# Patient Record
Sex: MALE | Race: WHITE | NOT HISPANIC OR LATINO | Employment: OTHER | URBAN - NONMETROPOLITAN AREA
[De-identification: names, ages, dates, MRNs, and addresses within clinical notes are randomized per-mention and may not be internally consistent; named-entity substitution may affect disease eponyms.]

---

## 2021-04-01 ENCOUNTER — IMMUNIZATION (OUTPATIENT)
Dept: VACCINE CLINIC | Facility: HOSPITAL | Age: 61
End: 2021-04-01

## 2021-04-01 PROCEDURE — 0001A: CPT | Performed by: INTERNAL MEDICINE

## 2021-04-01 PROCEDURE — 91300 HC SARSCOV02 VAC 30MCG/0.3ML IM: CPT | Performed by: INTERNAL MEDICINE

## 2021-04-29 ENCOUNTER — IMMUNIZATION (OUTPATIENT)
Dept: VACCINE CLINIC | Facility: HOSPITAL | Age: 61
End: 2021-04-29

## 2021-04-29 PROCEDURE — 91300 HC SARSCOV02 VAC 30MCG/0.3ML IM: CPT | Performed by: INTERNAL MEDICINE

## 2021-04-29 PROCEDURE — 0002A: CPT | Performed by: INTERNAL MEDICINE

## 2022-08-26 ENCOUNTER — LAB (OUTPATIENT)
Dept: LAB | Facility: HOSPITAL | Age: 62
End: 2022-08-26

## 2022-08-26 ENCOUNTER — OFFICE VISIT (OUTPATIENT)
Dept: INTERNAL MEDICINE | Facility: CLINIC | Age: 62
End: 2022-08-26

## 2022-08-26 VITALS
DIASTOLIC BLOOD PRESSURE: 60 MMHG | BODY MASS INDEX: 24.8 KG/M2 | HEIGHT: 63 IN | OXYGEN SATURATION: 97 % | TEMPERATURE: 97 F | HEART RATE: 44 BPM | RESPIRATION RATE: 20 BRPM | WEIGHT: 140 LBS | SYSTOLIC BLOOD PRESSURE: 132 MMHG

## 2022-08-26 DIAGNOSIS — I25.10 CORONARY ARTERY DISEASE INVOLVING NATIVE CORONARY ARTERY OF NATIVE HEART WITHOUT ANGINA PECTORIS: ICD-10-CM

## 2022-08-26 DIAGNOSIS — I73.9 PAD (PERIPHERAL ARTERY DISEASE): ICD-10-CM

## 2022-08-26 DIAGNOSIS — G89.29 CHRONIC LOW BACK PAIN, UNSPECIFIED BACK PAIN LATERALITY, UNSPECIFIED WHETHER SCIATICA PRESENT: ICD-10-CM

## 2022-08-26 DIAGNOSIS — I25.10 CORONARY ARTERY DISEASE INVOLVING NATIVE CORONARY ARTERY OF NATIVE HEART WITHOUT ANGINA PECTORIS: Primary | ICD-10-CM

## 2022-08-26 DIAGNOSIS — R41.3 MEMORY DEFICIT: ICD-10-CM

## 2022-08-26 DIAGNOSIS — M54.50 CHRONIC LOW BACK PAIN, UNSPECIFIED BACK PAIN LATERALITY, UNSPECIFIED WHETHER SCIATICA PRESENT: ICD-10-CM

## 2022-08-26 DIAGNOSIS — I25.2 HISTORY OF HEART ATTACK: ICD-10-CM

## 2022-08-26 DIAGNOSIS — I48.0 PAROXYSMAL ATRIAL FIBRILLATION: ICD-10-CM

## 2022-08-26 DIAGNOSIS — R73.9 HYPERGLYCEMIA: ICD-10-CM

## 2022-08-26 DIAGNOSIS — Z23 NEED FOR COVID-19 VACCINE: ICD-10-CM

## 2022-08-26 DIAGNOSIS — I73.9 INTERMITTENT CLAUDICATION: ICD-10-CM

## 2022-08-26 DIAGNOSIS — Z95.5 S/P DRUG ELUTING CORONARY STENT PLACEMENT: ICD-10-CM

## 2022-08-26 DIAGNOSIS — J44.1 COPD EXACERBATION: ICD-10-CM

## 2022-08-26 PROBLEM — R29.898 WEAKNESS OF LOWER EXTREMITY: Status: ACTIVE | Noted: 2021-05-10

## 2022-08-26 PROBLEM — Z79.891 LONG-TERM CURRENT USE OF OPIATE ANALGESIC: Status: ACTIVE | Noted: 2018-09-17

## 2022-08-26 PROBLEM — F17.200 TOBACCO DEPENDENCE: Status: ACTIVE | Noted: 2020-12-03

## 2022-08-26 PROBLEM — I70.229 CRITICAL LOWER LIMB ISCHEMIA (HCC): Status: ACTIVE | Noted: 2021-08-31

## 2022-08-26 PROBLEM — F41.0 PANIC ATTACKS: Status: ACTIVE | Noted: 2020-10-15

## 2022-08-26 PROBLEM — Z91.81 AT HIGH RISK FOR FALLS: Status: ACTIVE | Noted: 2022-01-19

## 2022-08-26 PROBLEM — T82.868A: Status: ACTIVE | Noted: 2021-08-31

## 2022-08-26 PROBLEM — Z79.4 ENCOUNTER FOR LONG-TERM (CURRENT) USE OF INSULIN: Status: ACTIVE | Noted: 2018-09-17

## 2022-08-26 PROBLEM — N17.9 ACUTE KIDNEY INJURY: Status: ACTIVE | Noted: 2021-09-02

## 2022-08-26 PROBLEM — M54.16 LUMBAR RADICULOPATHY: Status: ACTIVE | Noted: 2019-02-04

## 2022-08-26 PROBLEM — G63 POLYNEUROPATHY ASSOCIATED WITH UNDERLYING DISEASE: Status: ACTIVE | Noted: 2021-09-01

## 2022-08-26 PROBLEM — F10.11 HISTORY OF ALCOHOL ABUSE: Status: ACTIVE | Noted: 2020-05-21

## 2022-08-26 PROBLEM — J43.1 PANLOBULAR EMPHYSEMA: Status: ACTIVE | Noted: 2021-01-11

## 2022-08-26 PROBLEM — D62 ANEMIA DUE TO BLOOD LOSS, ACUTE: Status: ACTIVE | Noted: 2021-09-02

## 2022-08-26 PROBLEM — E78.2 MODERATE MIXED HYPERLIPIDEMIA NOT REQUIRING STATIN THERAPY: Status: ACTIVE | Noted: 2020-05-21

## 2022-08-26 PROBLEM — I21.3 ST ELEVATION (STEMI) MYOCARDIAL INFARCTION OF UNSPECIFIED SITE (HCC): Status: ACTIVE | Noted: 2020-05-08

## 2022-08-26 PROBLEM — I10 ESSENTIAL HYPERTENSION: Status: ACTIVE | Noted: 2019-02-04

## 2022-08-26 PROBLEM — R63.4 ABNORMAL WEIGHT LOSS: Status: ACTIVE | Noted: 2019-02-04

## 2022-08-26 LAB
ALBUMIN SERPL-MCNC: 4.4 G/DL (ref 3.5–5.2)
ALBUMIN/GLOB SERPL: 1.6 G/DL
ALP SERPL-CCNC: 106 U/L (ref 39–117)
ALT SERPL W P-5'-P-CCNC: 14 U/L (ref 1–41)
ANION GAP SERPL CALCULATED.3IONS-SCNC: 14.6 MMOL/L (ref 5–15)
AST SERPL-CCNC: 17 U/L (ref 1–40)
BASOPHILS # BLD AUTO: 0.07 10*3/MM3 (ref 0–0.2)
BASOPHILS NFR BLD AUTO: 0.8 % (ref 0–1.5)
BILIRUB SERPL-MCNC: 0.3 MG/DL (ref 0–1.2)
BUN SERPL-MCNC: 20 MG/DL (ref 8–23)
BUN/CREAT SERPL: 15.4 (ref 7–25)
CALCIUM SPEC-SCNC: 9.6 MG/DL (ref 8.6–10.5)
CHLORIDE SERPL-SCNC: 102 MMOL/L (ref 98–107)
CHOLEST SERPL-MCNC: 132 MG/DL (ref 0–200)
CO2 SERPL-SCNC: 20.4 MMOL/L (ref 22–29)
CREAT SERPL-MCNC: 1.3 MG/DL (ref 0.76–1.27)
DEPRECATED RDW RBC AUTO: 45.3 FL (ref 37–54)
EGFRCR SERPLBLD CKD-EPI 2021: 62.9 ML/MIN/1.73
EOSINOPHIL # BLD AUTO: 0.14 10*3/MM3 (ref 0–0.4)
EOSINOPHIL NFR BLD AUTO: 1.6 % (ref 0.3–6.2)
ERYTHROCYTE [DISTWIDTH] IN BLOOD BY AUTOMATED COUNT: 13.1 % (ref 12.3–15.4)
GLOBULIN UR ELPH-MCNC: 2.8 GM/DL
GLUCOSE SERPL-MCNC: 91 MG/DL (ref 65–99)
HCT VFR BLD AUTO: 41.8 % (ref 37.5–51)
HDLC SERPL-MCNC: 38 MG/DL (ref 40–60)
HGB BLD-MCNC: 13.9 G/DL (ref 13–17.7)
IMM GRANULOCYTES # BLD AUTO: 0.05 10*3/MM3 (ref 0–0.05)
IMM GRANULOCYTES NFR BLD AUTO: 0.6 % (ref 0–0.5)
LDLC SERPL CALC-MCNC: 81 MG/DL (ref 0–100)
LDLC/HDLC SERPL: 2.16 {RATIO}
LYMPHOCYTES # BLD AUTO: 2.91 10*3/MM3 (ref 0.7–3.1)
LYMPHOCYTES NFR BLD AUTO: 34.1 % (ref 19.6–45.3)
MCH RBC QN AUTO: 31.5 PG (ref 26.6–33)
MCHC RBC AUTO-ENTMCNC: 33.3 G/DL (ref 31.5–35.7)
MCV RBC AUTO: 94.8 FL (ref 79–97)
MONOCYTES # BLD AUTO: 0.59 10*3/MM3 (ref 0.1–0.9)
MONOCYTES NFR BLD AUTO: 6.9 % (ref 5–12)
NEUTROPHILS NFR BLD AUTO: 4.77 10*3/MM3 (ref 1.7–7)
NEUTROPHILS NFR BLD AUTO: 56 % (ref 42.7–76)
NRBC BLD AUTO-RTO: 0 /100 WBC (ref 0–0.2)
PLATELET # BLD AUTO: 348 10*3/MM3 (ref 140–450)
PMV BLD AUTO: 10.2 FL (ref 6–12)
POTASSIUM SERPL-SCNC: 4.2 MMOL/L (ref 3.5–5.2)
PROT SERPL-MCNC: 7.2 G/DL (ref 6–8.5)
RBC # BLD AUTO: 4.41 10*6/MM3 (ref 4.14–5.8)
SODIUM SERPL-SCNC: 137 MMOL/L (ref 136–145)
T4 FREE SERPL-MCNC: 1.55 NG/DL (ref 0.93–1.7)
TRIGL SERPL-MCNC: 60 MG/DL (ref 0–150)
TSH SERPL DL<=0.05 MIU/L-ACNC: 3.07 UIU/ML (ref 0.27–4.2)
VLDLC SERPL-MCNC: 13 MG/DL (ref 5–40)
WBC NRBC COR # BLD: 8.53 10*3/MM3 (ref 3.4–10.8)

## 2022-08-26 PROCEDURE — 91305 COVID-19 (PFIZER) 12+ YRS: CPT | Performed by: NURSE PRACTITIONER

## 2022-08-26 PROCEDURE — 84443 ASSAY THYROID STIM HORMONE: CPT | Performed by: NURSE PRACTITIONER

## 2022-08-26 PROCEDURE — 80053 COMPREHEN METABOLIC PANEL: CPT | Performed by: NURSE PRACTITIONER

## 2022-08-26 PROCEDURE — 0054A COVID-19 (PFIZER) 12+ YRS: CPT | Performed by: NURSE PRACTITIONER

## 2022-08-26 PROCEDURE — 84439 ASSAY OF FREE THYROXINE: CPT | Performed by: NURSE PRACTITIONER

## 2022-08-26 PROCEDURE — 80061 LIPID PANEL: CPT | Performed by: NURSE PRACTITIONER

## 2022-08-26 PROCEDURE — 85025 COMPLETE CBC W/AUTO DIFF WBC: CPT | Performed by: NURSE PRACTITIONER

## 2022-08-26 PROCEDURE — 99204 OFFICE O/P NEW MOD 45 MIN: CPT | Performed by: NURSE PRACTITIONER

## 2022-08-26 RX ORDER — AMOXICILLIN 250 MG
1 CAPSULE ORAL DAILY
COMMUNITY
Start: 2022-07-29 | End: 2022-11-03 | Stop reason: SDUPTHER

## 2022-08-26 RX ORDER — ASPIRIN 81 MG/1
81 TABLET, CHEWABLE ORAL DAILY
COMMUNITY
Start: 2022-07-29 | End: 2022-11-03 | Stop reason: SDUPTHER

## 2022-08-26 RX ORDER — RIVAROXABAN 20 MG/1
20 TABLET, FILM COATED ORAL
COMMUNITY
Start: 2022-07-29 | End: 2022-12-02 | Stop reason: SDUPTHER

## 2022-08-26 RX ORDER — HYDROXYZINE HYDROCHLORIDE 25 MG/1
TABLET, FILM COATED ORAL
COMMUNITY
End: 2023-01-30 | Stop reason: SDUPTHER

## 2022-08-26 RX ORDER — GABAPENTIN 300 MG/1
300 CAPSULE ORAL 3 TIMES DAILY
COMMUNITY
Start: 2022-08-09

## 2022-08-26 RX ORDER — PANTOPRAZOLE SODIUM 40 MG/1
40 TABLET, DELAYED RELEASE ORAL DAILY
COMMUNITY
Start: 2022-01-19

## 2022-08-26 RX ORDER — ATORVASTATIN CALCIUM 80 MG/1
80 TABLET, FILM COATED ORAL DAILY
COMMUNITY
Start: 2022-08-03 | End: 2023-02-07 | Stop reason: SDUPTHER

## 2022-08-26 RX ORDER — OXYCODONE AND ACETAMINOPHEN 10; 325 MG/1; MG/1
1 TABLET ORAL EVERY 6 HOURS PRN
COMMUNITY
Start: 2022-08-10

## 2022-08-26 NOTE — PROGRESS NOTES
Subjective   Kike Escamilla is a 61 y.o. male    Chief Complaint   Patient presents with   • Establish Care   • Leg Pain     2 yrs, right leg, whole leg, very painful and numb from groin down     History of Present Illness       New pt here to establish care     Pt is unfortunately a poor historian, so med hx is difficult to obtain.      CAD - h/o MI, unsure of date.  States that he knows he was followed previously by [facility], but would like to transfer to [facility].  He denies any current CP or SOA.  States that his HR always runs low    PAD - pt states that he has known PAD.  Has had procedures to both legs.  Unsure of date or where. In review of outside records, he did have an JAK and arterial duplex in June 2021 showing:  Left:  Abnormal study consistent with hemodynamically significant arterial inflow disease resulting in a severe insufficiency at rest.   Left limb of endograft is occluded, with flow reconstituted to the common femoral artery via collateral vessels    Then in March 2022 he had repeat JAK's showing:  Right: Abnormal study consistent with hemodynamically significant infrainguinal arterial disease resulting in a mild insufficiency at rest. There is a significant decrease when compared to the previous study on 6/7/2021.     Left:  Abnormal study consistent with hemodynamically significant infrainguinal arterial disease resulting in a mild insufficiency at rest.  There is a significant improvement when compared to the previous study on 6/7/2021.      Arterial Duplex 3/22:  The aorto iliac stent grafts are patent; no evidence of a hemodynamically significant stenosis.      He states that his legs are very painful.  He can only walk 10-20 feet before his legs begin to hurt.  He would like to be referred to vascular.  States that his sx's are similar to what they were prior to him needing intervention for PAD.      HL - chronic and stable on Lipitor 80 mg daily.     Chronic Pain - managed by [facility]  pain    Pt states that he knows he has problems with his memory.  Not currently followed by Neuro, but does agree to a referral    Past Medical History:   Diagnosis Date   • CAD (coronary artery disease)    • COPD (chronic obstructive pulmonary disease) ([facility])    • History of heart attack    • Hyperlipidemia    • Low back pain    • PAD (peripheral artery disease) ([facility])      History reviewed. No pertinent surgical history.    Family History   Problem Relation Age of Onset   • COPD Mother    • No Known Problems Father    • No Known Problems Brother    • No Known Problems Son      Social History     Socioeconomic History   • Marital status:    Tobacco Use   • Smoking status: Current Every Day Smoker     Packs/day: 0.25     Years: 40.00     Pack years: 10.00     Types: Cigarettes     Start date: 1/1/1975   • Smokeless tobacco: Never Used   Vaping Use   • Vaping Use: Never used   Substance and Sexual Activity   • Alcohol use: Not Currently   • Drug use: Never     Allergies   Allergen Reactions   • Pregabalin GI Intolerance and Other (See Comments)         The following portions of the patient's history were reviewed and updated as appropriate: allergies, current medications, past family history, past medical history, past social history, past surgical history and problem list.    Current Outpatient Medications:   •  aspirin 81 MG chewable tablet, , Disp: , Rfl:   •  atorvastatin (LIPITOR) 80 MG tablet, , Disp: , Rfl:   •  cyanocobalamin (VITAMIN B-12) 1000 MCG tablet, cyanocobalamin (vit B-12) 1,000 mcg tablet  Take by oral route., Disp: , Rfl:   •  gabapentin (NEURONTIN) 300 MG capsule, , Disp: , Rfl:   •  hydrOXYzine (ATARAX) 25 MG tablet, hydroxyzine HCl 25 mg tablet  Take by oral route., Disp: , Rfl:   •  oxyCODONE-acetaminophen (PERCOCET)  MG per tablet, , Disp: , Rfl:   •  pantoprazole (PROTONIX) 40 MG EC tablet, pantoprazole 40 mg tablet,delayed release  PRN, Disp: , Rfl:   •  ProAir  (90 Base)  MCG/ACT inhaler, , Disp: , Rfl:   •  sennosides-docusate (PERICOLACE) 8.6-50 MG per tablet, , Disp: , Rfl:   •  umeclidinium-vilanterol (Anoro Ellipta) 62.5-25 MCG/INH aerosol powder  inhaler, Anoro Ellipta 62.5 mcg-25 mcg/actuation powder for inhalation  Inhale by inhalation route., Disp: , Rfl:   •  Xarelto 20 MG tablet, , Disp: , Rfl:      Review of Systems   Constitutional: Negative for chills, fatigue and fever.   Respiratory: Negative for cough, chest tightness and shortness of breath.    Cardiovascular: Negative for chest pain and leg swelling.        Leg pain   Gastrointestinal: Negative for abdominal pain, diarrhea, nausea and vomiting.   Endocrine: Negative for cold intolerance and heat intolerance.   Musculoskeletal: Positive for arthralgias and myalgias.   Neurological: Negative for dizziness.   Psychiatric/Behavioral: Positive for confusion.       Objective   Physical Exam  Constitutional:       Appearance: He is well-developed.   HENT:      Head: Normocephalic and atraumatic.   Eyes:      Conjunctiva/sclera: Conjunctivae normal.      Pupils: Pupils are equal, round, and reactive to light.   Cardiovascular:      Rate and Rhythm: Normal rate and regular rhythm.      Pulses:           Dorsalis pedis pulses are 1+ on the right side and 1+ on the left side.        Posterior tibial pulses are 1+ on the right side and 1+ on the left side.      Heart sounds: Normal heart sounds.   Pulmonary:      Effort: Pulmonary effort is normal.      Breath sounds: Normal breath sounds.   Abdominal:      General: Bowel sounds are normal.      Palpations: Abdomen is soft.   Musculoskeletal:         General: Normal range of motion.      Cervical back: Normal range of motion.      Right lower leg: No edema.      Left lower leg: No edema.   Skin:     General: Skin is warm and dry.   Neurological:      Mental Status: He is alert and oriented to person, place, and time.      Deep Tendon Reflexes: Reflexes are normal and  "symmetric.   Psychiatric:         Speech: Speech normal.         Behavior: Behavior normal.         Thought Content: Thought content normal.         Cognition and Memory: Cognition is impaired. Memory is impaired.         Judgment: Judgment normal.       Vitals:    08/26/22 1048   BP: 132/60   Cuff Size: Adult   Pulse: (!) 44   Resp: 20   Temp: 97 °F (36.1 °C)   TempSrc: Infrared   SpO2: 97%   Weight: 63.5 kg (140 lb)   Height: 160 cm (63\")         Assessment & Plan   Diagnoses and all orders for this visit:    1. Coronary artery disease involving native coronary artery of native heart without angina pectoris (Primary)  -     Ambulatory Referral to Cardiology  -     [facility] & Differential; Future  -     Comprehensive Metabolic Panel; Future  -     Lipid Panel; Future  -     TSH; Future  -     T4, Free; Future    2. Paroxysmal atrial fibrillation (HCC)  -     Ambulatory Referral to Cardiology  -     CBC & Differential; Future  -     Comprehensive Metabolic Panel; Future  -     Lipid Panel; Future  -     TSH; Future  -     T4, Free; Future    3. S/P drug eluting coronary stent placement  -     Ambulatory Referral to Cardiology  -     CBC & Differential; Future  -     Comprehensive Metabolic Panel; Future  -     Lipid Panel; Future  -     TSH; Future  -     T4, Free; Future    4. History of heart attack  -     Ambulatory Referral to Cardiology  -     CBC & Differential; Future  -     Comprehensive Metabolic Panel; Future  -     Lipid Panel; Future  -     TSH; Future  -     T4, Free; Future    5. PAD (peripheral artery disease) (HCC)  -     Ambulatory Referral to Cardiology  -     Ambulatory Referral to Vascular Surgery  -     [facility] & Differential; Future  -     Comprehensive Metabolic Panel; Future  -     Lipid Panel; Future  -     TSH; Future  -     T4, Free; Future    6. Hyperglycemia  -     CBC & Differential; Future  -     Comprehensive Metabolic Panel; Future  -     Lipid Panel; Future  -     TSH; Future  -     T4, " Free; Future    7. Chronic low back pain, unspecified back pain laterality, unspecified whether sciatica present  -     CBC & Differential; Future  -     Comprehensive Metabolic Panel; Future  -     Lipid Panel; Future  -     TSH; Future  -     T4, Free; Future    8. COPD exacerbation (HCC)  -     Ambulatory Referral to Pulmonology  -     CBC & Differential; Future  -     Comprehensive Metabolic Panel; Future  -     Lipid Panel; Future  -     TSH; Future  -     T4, Free; Future    9. Intermittent claudication (HCC)  -     Ambulatory Referral to Vascular Surgery  -     CBC & Differential; Future  -     Comprehensive Metabolic Panel; Future  -     Lipid Panel; Future  -     TSH; Future  -     T4, Free; Future    10. Memory deficit  -     Ambulatory Referral to Neurology  -     CBC & Differential; Future  -     Comprehensive Metabolic Panel; Future  -     Lipid Panel; Future  -     TSH; Future  -     T4, Free; Future    11. Need for COVID-19 vaccine  -     COVID-19 Vaccine (Pfizer) Gray Cap      Referred to Cardiology, Vascular, Neurology and Pulm  Labs sent today  No refills needed  COVID booster updated  Return in about 6 weeks (around 10/7/2022) for Annual.

## 2022-08-31 ENCOUNTER — TELEPHONE (OUTPATIENT)
Dept: INTERNAL MEDICINE | Facility: CLINIC | Age: 62
End: 2022-08-31

## 2022-08-31 NOTE — TELEPHONE ENCOUNTER
Caller: Kike Escamilla    Relationship: Self    Best call back number: [phone]    What specialty or service is being requested: CARDIOLOGY     What is the provider, practice or medical service name: [facility]       Any additional details:      APPOINTMENT WITH VASCULAR/HEART DOCTOR ([facility])  IS ON THE OTHER SIDE OF [place] AND PATIENT LIVES IN [facility].  CAN WE FIND A DOCTOR THAT IS CLOSER TO PATIENT?

## 2022-09-02 NOTE — TELEPHONE ENCOUNTER
I do not think this would be good for his peripheral artery disease.  I would like for him to see cards and/or vascular before we went to a scooter

## 2022-09-07 ENCOUNTER — OFFICE VISIT (OUTPATIENT)
Dept: PULMONOLOGY | Facility: CLINIC | Age: 62
End: 2022-09-07

## 2022-09-07 VITALS
WEIGHT: 150 LBS | HEIGHT: 63 IN | SYSTOLIC BLOOD PRESSURE: 144 MMHG | BODY MASS INDEX: 26.58 KG/M2 | DIASTOLIC BLOOD PRESSURE: 76 MMHG | RESPIRATION RATE: 16 BRPM | TEMPERATURE: 98.2 F | HEART RATE: 52 BPM | OXYGEN SATURATION: 94 %

## 2022-09-07 DIAGNOSIS — J43.1 PANLOBULAR EMPHYSEMA: Primary | ICD-10-CM

## 2022-09-07 DIAGNOSIS — F17.200 TOBACCO DEPENDENCE: ICD-10-CM

## 2022-09-07 DIAGNOSIS — R91.1 LUNG NODULE: ICD-10-CM

## 2022-09-07 PROCEDURE — 99204 OFFICE O/P NEW MOD 45 MIN: CPT | Performed by: INTERNAL MEDICINE

## 2022-09-07 PROCEDURE — 94729 DIFFUSING CAPACITY: CPT | Performed by: INTERNAL MEDICINE

## 2022-09-07 PROCEDURE — 94726 PLETHYSMOGRAPHY LUNG VOLUMES: CPT | Performed by: INTERNAL MEDICINE

## 2022-09-07 PROCEDURE — 94060 EVALUATION OF WHEEZING: CPT | Performed by: INTERNAL MEDICINE

## 2022-09-07 RX ORDER — ALBUTEROL SULFATE 90 UG/1
2 AEROSOL, METERED RESPIRATORY (INHALATION) EVERY 4 HOURS PRN
Qty: 18 G | Refills: 11 | Status: SHIPPED | OUTPATIENT
Start: 2022-09-07

## 2022-09-07 RX ORDER — ALBUTEROL SULFATE 90 UG/1
4 AEROSOL, METERED RESPIRATORY (INHALATION) ONCE
Status: COMPLETED | OUTPATIENT
Start: 2022-09-07 | End: 2022-09-07

## 2022-09-07 RX ADMIN — ALBUTEROL SULFATE 4 PUFF: 90 AEROSOL, METERED RESPIRATORY (INHALATION) at 14:34

## 2022-09-07 NOTE — PROGRESS NOTES
New Patient Pulmonary Office Visit      Patient Name: Kike Escamilla    Referring Physician: Reyna Aleman AP*    Chief Complaint:    Chief Complaint   Patient presents with   • COPD       History of Present Illness: Kike Escamilla is a 61 y.o. male who is here today to establish care with Pulmonary.  Patient's past medical history sniffing for COPD, coronary artery disease, hyperlipidemia, and peripheral arterial disease.  He was referred to pulmonary for evaluation of COPD.  Patient states that he has been short of breath for multiple years now but thinks everything is currently stable.  He is currently utilizing Anoro 1 puff once daily and albuterol on a as needed basis.  There are multiple chest x-ray reports back from 2021 saying that he was on a ventilator.  He has no recollection of this.  Denies any cough, nausea, fever, chills, or chest pain.  He smokes roughly 5 cigarettes/day.  No other acute complaints.    Review of Systems:   Review of Systems   Constitutional: Negative for activity change, appetite change, chills and diaphoresis.   HENT: Negative for congestion, postnasal drip, sinus pressure and voice change.    Eyes: Negative for blurred vision.   Respiratory: Negative for cough, shortness of breath and wheezing.    Cardiovascular: Negative for chest pain.   Gastrointestinal: Negative for abdominal pain.   Musculoskeletal: Negative for myalgias.   Skin: Negative for color change and dry skin.   Allergic/Immunologic: Negative for environmental allergies.   Neurological: Negative for weakness and confusion.   Hematological: Negative for adenopathy.   Psychiatric/Behavioral: Negative for sleep disturbance and depressed mood.       Past Medical History:   Past Medical History:   Diagnosis Date   • CAD (coronary artery disease)    • COPD (chronic obstructive pulmonary disease) ([facility])    • History of heart attack    • Hyperlipidemia    • Low back pain    • PAD (peripheral artery disease) ([facility])         Past Surgical History: History reviewed. No pertinent surgical history.    Family History:   Family History   Problem Relation Age of Onset   • COPD Mother    • No Known Problems Father    • No Known Problems Brother    • No Known Problems Son        Social History:   Social History     Socioeconomic History   • Marital status:    Tobacco Use   • Smoking status: Current Every Day Smoker     Packs/day: 0.25     Years: 40.00     Pack years: 10.00     Types: Cigarettes     Start date: 1/1/1975   • Smokeless tobacco: Never Used   Vaping Use   • Vaping Use: Never used   Substance and Sexual Activity   • Alcohol use: Not Currently   • Drug use: Never       Medications:     Current Outpatient Medications:   •  aspirin 81 MG chewable tablet, Chew 81 mg Daily., Disp: , Rfl:   •  atorvastatin (LIPITOR) 80 MG tablet, Take 80 mg by mouth Daily., Disp: , Rfl:   •  cyanocobalamin (VITAMIN B-12) 1000 MCG tablet, cyanocobalamin (vit B-12) 1,000 mcg tablet  Take by oral route., Disp: , Rfl:   •  gabapentin (NEURONTIN) 300 MG capsule, Take 300 mg by mouth 3 (Three) Times a Day., Disp: , Rfl:   •  hydrOXYzine (ATARAX) 25 MG tablet, hydroxyzine HCl 25 mg tablet  Take by oral route., Disp: , Rfl:   •  oxyCODONE-acetaminophen (PERCOCET)  MG per tablet, Take 1 tablet by mouth Every 6 (Six) Hours As Needed., Disp: , Rfl:   •  pantoprazole (PROTONIX) 40 MG EC tablet, Take 40 mg by mouth Daily., Disp: , Rfl:   •  sennosides-docusate (PERICOLACE) 8.6-50 MG per tablet, Take 1 tablet by mouth Daily., Disp: , Rfl:   •  umeclidinium-vilanterol (Anoro Ellipta) 62.5-25 MCG/INH aerosol powder  inhaler, Inhale 1 puff Daily., Disp: 60 each, Rfl: 11  •  Xarelto 20 MG tablet, Take 20 mg by mouth Daily With Dinner., Disp: , Rfl:   •  albuterol sulfate  (90 Base) MCG/ACT inhaler, Inhale 2 puffs Every 4 (Four) Hours As Needed for Wheezing., Disp: 18 g, Rfl: 11  No current facility-administered medications for this  "visit.    Allergies:   Allergies   Allergen Reactions   • Pregabalin GI Intolerance and Other (See Comments)       Physical Exam:  Vital Signs:   Vitals:    09/07/22 1409   BP: 144/76   BP Location: Right arm   Patient Position: Sitting   Cuff Size: Adult   Pulse: 52   Resp: 16   Temp: 98.2 °F (36.8 °C)   SpO2: 94%  Comment: room air at rest   Weight: 68 kg (150 lb)   Height: 160 cm (63\")       Physical Exam  Vitals and nursing note reviewed.   Constitutional:       General: He is not in acute distress.     Appearance: He is well-developed and normal weight. He is not ill-appearing or toxic-appearing.   Cardiovascular:      Rate and Rhythm: Normal rate and regular rhythm.      Pulses: Normal pulses.      Heart sounds: Normal heart sounds. No murmur heard.    No gallop.   Pulmonary:      Effort: Pulmonary effort is normal.      Breath sounds: Normal breath sounds. No wheezing, rhonchi or rales.   Musculoskeletal:      Right lower leg: No edema.      Left lower leg: No edema.   Neurological:      Mental Status: He is alert.         Immunization History   Administered Date(s) Administered   • COVID-19 (PFIZER) PURPLE CAP 04/01/2021, 04/29/2021   • Covid-19 (Pfizer) Gray Cap 08/26/2022   • FluLaval/Fluzone >6mos 11/20/2020, 10/19/2021   • Pneumococcal Conjugate 13-Valent (PCV13) 11/20/2020   • Pneumococcal Polysaccharide (PPSV23) 12/06/2021   • Tdap 12/06/2021       Results Review:   - I personally reviewed the pts imaging from chest x-ray from 9/7/2022 showed significant hyperexpansion of the lungs consistent with emphysema, there is also a right upper lobe nodule.  I reviewed multiple chest x-ray reports from 2021 there is no mention of a right upper lobe nodule on any of the reports.  - I personally reviewed the pts PFT from 9/7/2022 showed moderate obstruction without restriction and a normal DLCO, FEV1 is 74%.  - I personally reviewed the pts chart with regards to evaluation by his PCP as well as his " hospitalization at [facility] back in 2021.    Assessment / Plan:   Diagnoses and all orders for this visit:    1. Panlobular emphysema (HCC) (Primary)  -Patient has gold stage 3 class B COPD  -Continue Anoro and albuterol for medication management  -latest PFTs as noted above  -Patient counseled on monitoring for COPD exacerbation and treatment plan  -Recommend 30 minutes of cardiovascular exercise per day.    2. Lung nodule  -Patient has a right upper lobe nodule seen on the chest x-ray, that has no mention on previous chest x-rays, given this and his tobacco history I am going to get a CT scan of the chest for better evaluation.    3. Tobacco dependence  -Highly recommend tobacco cessation.  Counseled the patient on tobacco cessation during today's visit.      Follow Up:   Return in about 6 weeks (around 10/19/2022) for CT Chest with next visit.     LUIS ANTONIO Mcgowan, DO  Pulmonary and Critical Care Medicine  Note Electronically Signed    Part of this note may be an electronic transcription/translation of spoken language to printed text using the Dragon Dictation System.

## 2022-09-23 ENCOUNTER — HOSPITAL ENCOUNTER (OUTPATIENT)
Dept: CT IMAGING | Facility: HOSPITAL | Age: 62
Discharge: HOME | End: 2022-09-23
Admitting: INTERNAL MEDICINE

## 2022-09-23 DIAGNOSIS — R91.1 LUNG NODULE: ICD-10-CM

## 2022-09-23 PROCEDURE — 71250 CT THORAX DX C-: CPT

## 2022-09-27 ENCOUNTER — OFFICE VISIT (OUTPATIENT)
Dept: CARDIOLOGY | Facility: CLINIC | Age: 62
End: 2022-09-27

## 2022-09-27 ENCOUNTER — PREP FOR SURGERY (OUTPATIENT)
Dept: OTHER | Facility: HOSPITAL | Age: 62
End: 2022-09-27

## 2022-09-27 VITALS
WEIGHT: 145 LBS | DIASTOLIC BLOOD PRESSURE: 90 MMHG | OXYGEN SATURATION: 95 % | BODY MASS INDEX: 18.03 KG/M2 | SYSTOLIC BLOOD PRESSURE: 178 MMHG | HEART RATE: 58 BPM | HEIGHT: 75 IN

## 2022-09-27 DIAGNOSIS — I73.9 PVD (PERIPHERAL VASCULAR DISEASE) WITH CLAUDICATION: Primary | ICD-10-CM

## 2022-09-27 DIAGNOSIS — R06.09 DYSPNEA ON EXERTION: ICD-10-CM

## 2022-09-27 DIAGNOSIS — I25.10 CORONARY ARTERY DISEASE INVOLVING NATIVE CORONARY ARTERY OF NATIVE HEART WITHOUT ANGINA PECTORIS: ICD-10-CM

## 2022-09-27 PROCEDURE — 99204 OFFICE O/P NEW MOD 45 MIN: CPT | Performed by: INTERNAL MEDICINE

## 2022-09-27 PROCEDURE — 93000 ELECTROCARDIOGRAM COMPLETE: CPT | Performed by: INTERNAL MEDICINE

## 2022-09-27 NOTE — PROGRESS NOTES
[facility] Cardiology  Consultation H&P  Kike Escamilla  1960  [phone]  There is no work phone number on file..    VISIT DATE:  09/27/2022    PCP: Reyna Aleman, APRN  [address]  Former[facility] [address]    CC:  Chief Complaint   Patient presents with   • Coronary Artery Disease       Previous cardiac studies and procedures:  May 2020  TTE: EF 40 to 50% with inferior akinesis and inferior lateral hypokinesis.  Mild MR.    July 2020 ABIs  Right: moderate insufficiency at rest.   Left: Normal study    June 2021   ABIs  Right:  Normal study; No evidence of hemodynamically significant arterial disease at rest.   Left:  Abnormal study consistent with hemodynamically significant arterial inflow disease resulting in a severe insufficiency at rest.     Duplex aorta iliac graft  Right limb of endograft is patent.   Left limb of endograft is occluded, with flow reconstituted to the common femoral artery via collateral vessels.     August 2021: Presented with aortic occlusion -  open aortoiliac thrombectomy, aortoiliac stent grafting, open bilateral femoral popliteal thrombectomy, bilateral 4 compartment fasciotomies.    March 2022 ABIs  Right: mild insufficiency at rest. There is a significant decrease when compared to the previous study on 6/7/2021.   Left: mild insufficiency at rest.  There is a significant improvement when compared to the previous study on 6/7/2021.     March 2022 duplex aortoiliac graft  The aorto iliac stent grafts are patent; no evidence of a hemodynamically significant stenosis.     ASSESSMENT:   Diagnosis Plan   1. PVD (peripheral vascular disease) with claudication (HCC)  Doppler Arterial Multi Level Lower Extremity - Bilateral CAR   2. Dyspnea on exertion  Adult Transthoracic Echo Complete W/ Cont if Necessary Per Protocol   3. Coronary artery disease involving native coronary artery of native heart without angina pectoris            PLAN:  Coronary artery disease: Describes previously having RCA stent.  Records from this intervention are not available.  Previous echo imaging was consistent with a mild ischemic cardiomyopathy with inferior and inferior lateral wall motion abnormality consistent with previous infarct.  Continue aspirin and high intensity statin therapy.  Afterload poorly controlled but he refuses any further medication titration at this time.    Hyperlipidemia: Goal LDL less than 70.  Continue atorvastatin 80 mg p.o. daily.    History of paroxysmal atrial fibrillation: Continue Xarelto 20 mg p.o. daily.  Currently asymptomatic.    Peripheral vascular disease: History of aortoiliac stenting with acute occlusion in August 2021 requiring emergent surgical thrombectomy and  4 compartment fasciotomies of the lower extremity.  Currently with chronic bilateral lower extremity neuropathic pain and potential limiting claudication.  Bilateral lower extremity duplex pending.  Continue aspirin, statin and Xarelto.    Nicotine abuse: Counseled on need for smoking cessation.    History of Present Illness   62-year-old gentleman, active smoker with a history of peripheral vascular disease, coronary disease and paroxysmal atrial fibrillation along with panlobular COPD and overall frailty.  Currently denies chest discomfort.  Stable shortness of breath in a class II pattern.  Currently smoking 5 cigarettes/day.  Ongoing evaluation by pulmonology colleague for underlying COPD.  He reports that his heart rate and blood pressures run too low all of the time and does not want to take any thing that lowers his blood pressure.  He has had significant bilateral paresthesias and neuropathic type pain since experiencing acute limb ischemia in August of last year.  He appears compliant with his medical therapy.  Reviewed recent laboratory evaluation.    PHYSICAL EXAMINATION:  Vitals:    09/27/22 1316   BP: 178/90   BP Location: Right arm   Patient  "Position: Sitting   Pulse: 58   SpO2: 95%   Weight: 65.8 kg (145 lb)   Height: 190.5 cm (75\")     General Appearance:    Alert, cooperative, no distress, appears stated age   Head:    Normocephalic, without obvious abnormality, atraumatic   Eyes:    conjunctiva/corneas clear, EOM's intact, fundi     benign, both eyes   Ears:    Normal TM's and external ear canals, both ears   Nose:   Nares normal, septum midline, mucosa normal, no drainage    or sinus tenderness   Throat:   Lips, mucosa, and tongue normal; teeth and gums normal   Neck:   Supple, symmetrical, trachea midline, no adenopathy;     thyroid:  no enlargement/tenderness/nodules; no carotid    bruit or JVD   Back:     Symmetric, no curvature, ROM normal, no CVA tenderness   Lungs:     Clear to auscultation bilaterally, respirations unlabored   Chest Wall:    No tenderness or deformity    Heart:    Regular rate and rhythm, S1 and S2 normal, no murmur, rub   or gallop, normal carotid impulse bilaterally without bruit.   Abdomen:     Soft, non-tender, bowel sounds active all four quadrants,     no masses, no organomegaly   Extremities:   Extremities normal, atraumatic, no cyanosis, pedal pulses are difficult to appreciate.   Pulses:   2+ and symmetric all extremities   Skin:   Skin color, texture, turgor normal, no rashes or lesions   Lymph nodes:   Cervical, supraclavicular, and axillary nodes normal   Neurologic:   normal strength, sensation intact     throughout       Diagnostic Data:    ECG 12 Lead    Date/Time: 9/27/2022 1:37 PM  Performed by: Andrew Barry III, MD  Authorized by: Andrew Barry III, MD   Previous ECG: no previous ECG available  Rhythm: sinus bradycardia  Other findings: non-specific ST-T wave changes    Clinical impression: abnormal EKG          Lab Results   Component Value Date    TRIG 60 08/26/2022    HDL 38 (L) 08/26/2022     Lab Results   Component Value Date    GLUCOSE 91 08/26/2022    BUN 20 08/26/2022    CREATININE 1.30 (H) " 08/26/2022     08/26/2022    K 4.2 08/26/2022     08/26/2022    CO2 20.4 (L) 08/26/2022     No results found for: HGBA1C  Lab Results   Component Value Date    WBC 8.53 08/26/2022    HGB 13.9 08/26/2022    HCT 41.8 08/26/2022     08/26/2022       PROBLEM LIST:  Patient Active Problem List   Diagnosis   • Lumbar radiculopathy   • Abnormal weight loss   • Acute kidney injury ([facility])   • Anemia due to blood loss, acute   • Arterial graft thrombosis, initial encounter ([facility])   • At high risk for falls   • Coronary artery disease involving native coronary artery of native heart without angina pectoris   • Critical lower limb ischemia ([facility])   • Encounter for long-term (current) use of insulin ([facility])   • History of alcohol abuse   • Hyperglycemia   • Essential hypertension   • Long-term current use of opiate analgesic   • Low back pain   • Moderate mixed hyperlipidemia not requiring statin therapy   • PAD (peripheral artery disease) ([facility])   • Panic attacks   • Panlobular emphysema ([facility])   • Paroxysmal atrial fibrillation ([facility])   • Polyneuropathy associated with underlying disease ([facility])   • Presence of coronary angioplasty implant and graft   • S/P drug eluting coronary stent placement   • ST elevation (STEMI) myocardial infarction of unspecified site ([facility])   • Tobacco dependence   • Weakness of lower extremity   • Memory deficit   • Lung nodule       PAST MEDICAL HX  Past Medical History:   Diagnosis Date   • CAD (coronary artery disease)    • COPD (chronic obstructive pulmonary disease) ([facility])    • History of heart attack    • Hyperlipidemia    • Low back pain    • PAD (peripheral artery disease) ([facility])        Allergies  Allergies   Allergen Reactions   • Pregabalin GI Intolerance and Other (See Comments)       Current Medications    Current Outpatient Medications:   •  albuterol sulfate  (90 Base) MCG/ACT inhaler, Inhale 2 puffs Every 4 (Four) Hours As Needed for Wheezing., Disp: 18 g, Rfl: 11  •   aspirin 81 MG chewable tablet, Chew 81 mg Daily., Disp: , Rfl:   •  atorvastatin (LIPITOR) 80 MG tablet, Take 80 mg by mouth Daily., Disp: , Rfl:   •  cyanocobalamin (VITAMIN B-12) 1000 MCG tablet, cyanocobalamin (vit B-12) 1,000 mcg tablet  Take by oral route., Disp: , Rfl:   •  gabapentin (NEURONTIN) 300 MG capsule, Take 300 mg by mouth 3 (Three) Times a Day., Disp: , Rfl:   •  hydrOXYzine (ATARAX) 25 MG tablet, hydroxyzine HCl 25 mg tablet  Take by oral route., Disp: , Rfl:   •  oxyCODONE-acetaminophen (PERCOCET)  MG per tablet, Take 1 tablet by mouth Every 6 (Six) Hours As Needed., Disp: , Rfl:   •  pantoprazole (PROTONIX) 40 MG EC tablet, Take 40 mg by mouth Daily., Disp: , Rfl:   •  sennosides-docusate (PERICOLACE) 8.6-50 MG per tablet, Take 1 tablet by mouth Daily., Disp: , Rfl:   •  umeclidinium-vilanterol (Anoro Ellipta) 62.5-25 MCG/INH aerosol powder  inhaler, Inhale 1 puff Daily., Disp: 60 each, Rfl: 11  •  Xarelto 20 MG tablet, Take 20 mg by mouth Daily With Dinner., Disp: , Rfl:          ROS  ROS      SOCIAL HX  Social History     Socioeconomic History   • Marital status:    Tobacco Use   • Smoking status: Current Every Day Smoker     Packs/day: 0.25     Years: 40.00     Pack years: 10.00     Types: Cigarettes     Start date: 1/1/1975   • Smokeless tobacco: Never Used   • Tobacco comment: 4-5 cigarettes every morning with his coffee   Vaping Use   • Vaping Use: Never used   Substance and Sexual Activity   • Alcohol use: Not Currently   • Drug use: Never   • Sexual activity: Defer     Comment:        FAMILY HX  Family History   Problem Relation Age of Onset   • COPD Mother    • No Known Problems Father    • No Known Problems Brother    • No Known Problems Son              Andrew Barry III, MD, [facility]

## 2022-10-27 ENCOUNTER — OFFICE VISIT (OUTPATIENT)
Dept: PULMONOLOGY | Facility: CLINIC | Age: 62
End: 2022-10-27

## 2022-10-27 VITALS
BODY MASS INDEX: 17.71 KG/M2 | SYSTOLIC BLOOD PRESSURE: 136 MMHG | HEIGHT: 75 IN | HEART RATE: 50 BPM | TEMPERATURE: 97.4 F | DIASTOLIC BLOOD PRESSURE: 82 MMHG | OXYGEN SATURATION: 98 % | WEIGHT: 142.4 LBS

## 2022-10-27 DIAGNOSIS — Q27.30 AVM (ARTERIOVENOUS MALFORMATION): ICD-10-CM

## 2022-10-27 DIAGNOSIS — J43.1 PANLOBULAR EMPHYSEMA: Primary | ICD-10-CM

## 2022-10-27 DIAGNOSIS — F17.200 TOBACCO DEPENDENCE: ICD-10-CM

## 2022-10-27 PROCEDURE — 99215 OFFICE O/P EST HI 40 MIN: CPT | Performed by: INTERNAL MEDICINE

## 2022-10-27 RX ORDER — NICOTINE 21 MG/24HR
1 PATCH, TRANSDERMAL 24 HOURS TRANSDERMAL EVERY 24 HOURS
Qty: 28 EACH | Refills: 11 | Status: SHIPPED | OUTPATIENT
Start: 2022-10-27

## 2022-10-27 NOTE — PROGRESS NOTES
"Follow Up Office Note       Patient Name: Kike Escamilla    Referring Physician: No ref. provider found    Chief Complaint:    Chief Complaint   Patient presents with   • Emphysema   • Follow-up       History of Present Illness: Kike Escamilla is a 62 y.o. male who is here today to follow-up care with Pulmonary.  Patient's past medical history sniffing for COPD, coronary artery disease, hyperlipidemia, and peripheral arterial disease.  Patient states his breathing overall stable. Denies any chest pain, nausea, fever, or chills.  No issues with his Anoro currently also no issue with the albuterol.  He tries get regular exercise.  He still has some anxiety that has to deal with.  He is also developing with some leg pains and is trying to work with vascular surgery but see if there is anything new to help that as he has had multiple stents.  No other acute complaints.    Review of Systems:   Review of Systems   Constitutional: Negative for chills, fatigue and fever.   HENT: Negative for congestion and voice change.    Eyes: Negative for blurred vision.   Respiratory: Positive for shortness of breath. Negative for cough and wheezing.    Cardiovascular: Negative for chest pain.   Skin: Negative for dry skin.   Hematological: Negative for adenopathy.   Psychiatric/Behavioral: Negative for agitation and depressed mood.       The following portions of the patient's history were reviewed and updated as appropriate: allergies, current medications, past family history, past medical history, past social history, past surgical history and problem list.    Physical Exam:  Vital Signs:   Vitals:    10/27/22 1446   BP: 136/82   Pulse: 50   Temp: 97.4 °F (36.3 °C)   SpO2: 98%  Comment: resting, room air   Weight: 64.6 kg (142 lb 6.4 oz)   Height: 190.5 cm (75\")       Physical Exam  Vitals and nursing note reviewed.   Constitutional:       General: He is not in acute distress.     Appearance: He is well-developed and normal weight. " He is not ill-appearing or toxic-appearing.   Cardiovascular:      Rate and Rhythm: Normal rate and regular rhythm.      Pulses: Normal pulses.      Heart sounds: Normal heart sounds. No murmur heard.    No gallop.   Pulmonary:      Effort: Pulmonary effort is normal.      Breath sounds: Normal breath sounds. No wheezing, rhonchi or rales.   Musculoskeletal:      Right lower leg: No edema.      Left lower leg: No edema.   Neurological:      Mental Status: He is alert.         Immunization History   Administered Date(s) Administered   • COVID-19 (PFIZER) PURPLE CAP 04/01/2021, 04/29/2021   • Covid-19 (Pfizer) Gray Cap 08/26/2022   • FluLaval/Fluzone >6mos 11/20/2020, 10/19/2021   • Pneumococcal Conjugate 13-Valent (PCV13) 11/20/2020   • Pneumococcal Polysaccharide (PPSV23) 12/06/2021   • Tdap 12/06/2021       Results Review:   -I personally viewed the patient's CT scan of the chest from 9/23/2022, which showed a AVM in the right lower lobe with an anterior branch and extra branch 9 mm.  No other nodules, masses, or infiltrates seen.   - chest x-ray from 9/7/2022 showed significant hyperexpansion of the lungs consistent with emphysema, there is also a right upper lobe nodule.  I reviewed multiple chest x-ray reports from 2021 there is no mention of a right upper lobe nodule on any of the reports.  - PFT from 9/7/2022 showed moderate obstruction without restriction and a normal DLCO, FEV1 is 74%.    Assessment / Plan:   Diagnoses and all orders for this visit:    1. Panlobular emphysema (HCC) (Primary)  -Patient has gold stage 3 class C COPD  -Continue Anoro and albuterol for medication management  -latest PFTs as noted above  -Overnight oxygen testing ordered  -Patient counseled on monitoring for COPD exacerbation and treatment plan  -Recommend 30 minutes cardiovascular exercise per day, although he is limited due to the peripheral vascular disease in his legs    2. Right lower lobe AVM  -We discussed that the CT  finding showed that the nodule on his chest x-ray is actually an AVM.  It has a 9 mm exit branch which per recommendations would mean that he needs to have embolization to the area to close the AVM up.  I discussed the risk and the benefits of the procedure with him today.  He is agreeable to undergoing the procedure.  Therefore I have ordered the patient a pulmonary angiogram with AVM embolization with interventional radiology.  I have also discussed the case with Dr. Fernandez and he thinks that this is not he would be able to embolize.    3. Tobacco dependence  - In this visit the patient was advised to stop smoking and was offered tobacco cessation measures and resources, including NRT and/or medication intervention.  I specifically discussed with the patient psychological components to quitting smoking which includes determining why the patient wants to quit smoking, identifying smoking triggers, and figuring out ways to replace those triggers. Plan to follow-up at the next visit on status cessation success.  Patient was willing to quit at this time.  I have ordered nicotine patches for the patient.  Plan to follow back up in 6 months.  Goal is to have complete tobacco cessation within 1 year.    Level of service justified based on 41 minutes spent in patient care on this date of service including, but not limited to: preparing to see the patient, obtaining and/or reviewing history, performing medically appropriate examination, ordering tests/medicine/procedures, independently interpreting results, documenting clinical information in EHR, and counseling/education of patient/family/caregiver (Excluding time spent on other separate services such as performing procedures or test interpretation, if applicable). (Level 4 30-39 minutes; Level 5 40-54 minutes)    Follow Up:   Return in about 6 months (around 4/27/2023).       LUIS ANTONIO Mcgowan DO  Pulmonary and Critical Care Medicine  Note Electronically Signed    Part  of this note may be an electronic transcription/translation of spoken language to printed text using the Dragon Dictation System.

## 2022-10-28 ENCOUNTER — PREP FOR SURGERY (OUTPATIENT)
Dept: OTHER | Facility: HOSPITAL | Age: 62
End: 2022-10-28

## 2022-11-02 DIAGNOSIS — Q27.30 AVM (ARTERIOVENOUS MALFORMATION): Primary | ICD-10-CM

## 2022-11-04 ENCOUNTER — HOSPITAL ENCOUNTER (OUTPATIENT)
Dept: CARDIOLOGY | Facility: HOSPITAL | Age: 62
Discharge: HOME | End: 2022-11-04

## 2022-11-04 VITALS — WEIGHT: 142 LBS | BODY MASS INDEX: 17.66 KG/M2 | HEIGHT: 75 IN

## 2022-11-04 DIAGNOSIS — R06.09 DYSPNEA ON EXERTION: ICD-10-CM

## 2022-11-04 DIAGNOSIS — I73.9 PVD (PERIPHERAL VASCULAR DISEASE) WITH CLAUDICATION: ICD-10-CM

## 2022-11-04 PROCEDURE — 93306 TTE W/DOPPLER COMPLETE: CPT | Performed by: INTERNAL MEDICINE

## 2022-11-04 PROCEDURE — 93923 UPR/LXTR ART STDY 3+ LVLS: CPT

## 2022-11-04 PROCEDURE — 93923 UPR/LXTR ART STDY 3+ LVLS: CPT | Performed by: INTERNAL MEDICINE

## 2022-11-04 PROCEDURE — 93306 TTE W/DOPPLER COMPLETE: CPT

## 2022-11-07 ENCOUNTER — TELEPHONE (OUTPATIENT)
Dept: CARDIOLOGY | Facility: CLINIC | Age: 62
End: 2022-11-07

## 2022-11-07 LAB
BH CV ECHO MEAS - AO MAX PG: 11 MMHG
BH CV ECHO MEAS - AO MEAN PG: 3.7 MMHG
BH CV ECHO MEAS - AO ROOT DIAM: 3.2 CM
BH CV ECHO MEAS - AO V2 MAX: 166.1 CM/SEC
BH CV ECHO MEAS - AO V2 VTI: 32.5 CM
BH CV ECHO MEAS - AVA(I,D): 1.42 CM2
BH CV ECHO MEAS - EDV(CUBED): 199.3 ML
BH CV ECHO MEAS - EDV(MOD-SP2): 193 ML
BH CV ECHO MEAS - EDV(MOD-SP4): 189 ML
BH CV ECHO MEAS - EF(MOD-BP): 36.8 %
BH CV ECHO MEAS - EF(MOD-SP2): 40.9 %
BH CV ECHO MEAS - EF(MOD-SP4): 32.8 %
BH CV ECHO MEAS - ESV(CUBED): 110.3 ML
BH CV ECHO MEAS - ESV(MOD-SP2): 114 ML
BH CV ECHO MEAS - ESV(MOD-SP4): 127 ML
BH CV ECHO MEAS - FS: 17.9 %
BH CV ECHO MEAS - IVS/LVPW: 0.93 CM
BH CV ECHO MEAS - IVSD: 0.71 CM
BH CV ECHO MEAS - LA DIMENSION: 4.8 CM
BH CV ECHO MEAS - LAT PEAK E' VEL: 6.1 CM/SEC
BH CV ECHO MEAS - LV DIASTOLIC VOL/BSA (35-75): 99.8 CM2
BH CV ECHO MEAS - LV MASS(C)D: 158.8 GRAMS
BH CV ECHO MEAS - LV MAX PG: 3.8 MMHG
BH CV ECHO MEAS - LV MEAN PG: 1.33 MMHG
BH CV ECHO MEAS - LV SYSTOLIC VOL/BSA (12-30): 67.1 CM2
BH CV ECHO MEAS - LV V1 MAX: 97.7 CM/SEC
BH CV ECHO MEAS - LV V1 VTI: 19.1 CM
BH CV ECHO MEAS - LVIDD: 5.8 CM
BH CV ECHO MEAS - LVIDS: 4.8 CM
BH CV ECHO MEAS - LVOT AREA: 2.41 CM2
BH CV ECHO MEAS - LVOT DIAM: 1.75 CM
BH CV ECHO MEAS - LVPWD: 0.76 CM
BH CV ECHO MEAS - MED PEAK E' VEL: 4.5 CM/SEC
BH CV ECHO MEAS - MR MAX PG: 116.8 MMHG
BH CV ECHO MEAS - MR MAX VEL: 540.5 CM/SEC
BH CV ECHO MEAS - MR MEAN PG: 78.7 MMHG
BH CV ECHO MEAS - MR MEAN VEL: 421.3 CM/SEC
BH CV ECHO MEAS - MR VTI: 221 CM
BH CV ECHO MEAS - MV A MAX VEL: 34.3 CM/SEC
BH CV ECHO MEAS - MV DEC SLOPE: 626.6 CM/SEC2
BH CV ECHO MEAS - MV E MAX VEL: 88.3 CM/SEC
BH CV ECHO MEAS - MV E/A: 2.6
BH CV ECHO MEAS - MV MAX PG: 10.3 MMHG
BH CV ECHO MEAS - MV MEAN PG: 2.12 MMHG
BH CV ECHO MEAS - MV P1/2T: 73.8 MSEC
BH CV ECHO MEAS - MV V2 VTI: 53.2 CM
BH CV ECHO MEAS - MVA(P1/2T): 3 CM2
BH CV ECHO MEAS - MVA(VTI): 0.87 CM2
BH CV ECHO MEAS - PA ACC TIME: 0.15 SEC
BH CV ECHO MEAS - PA PR(ACCEL): 12.5 MMHG
BH CV ECHO MEAS - RAP SYSTOLE: 8 MMHG
BH CV ECHO MEAS - RVSP: 65 MMHG
BH CV ECHO MEAS - SI(MOD-SP2): 41.7 ML/M2
BH CV ECHO MEAS - SI(MOD-SP4): 32.7 ML/M2
BH CV ECHO MEAS - SV(LVOT): 46.2 ML
BH CV ECHO MEAS - SV(MOD-SP2): 79 ML
BH CV ECHO MEAS - SV(MOD-SP4): 62 ML
BH CV ECHO MEAS - TAPSE (>1.6): 1.62 CM
BH CV ECHO MEAS - TR MAX PG: 57 MMHG
BH CV ECHO MEAS - TR MAX VEL: 378 CM/SEC
BH CV ECHO MEASUREMENTS AVERAGE E/E' RATIO: 16.66
BH CV LOWER ARTERIAL LEFT ABI RATIO: 0.87
BH CV LOWER ARTERIAL LEFT CALF RATIO: 0.93
BH CV LOWER ARTERIAL LEFT DORSALIS PEDIS SYS MAX: 157
BH CV LOWER ARTERIAL LEFT GREAT TOE SYS MAX: 118
BH CV LOWER ARTERIAL LEFT HIGH THIGH RATIO: 0.96
BH CV LOWER ARTERIAL LEFT HIGH THIGH SYS MAX: 173
BH CV LOWER ARTERIAL LEFT LOW THIGH RATIO: 1.05
BH CV LOWER ARTERIAL LEFT LOW THIGH SYS MAX: 189
BH CV LOWER ARTERIAL LEFT POPLITEAL SYS MAX: 168
BH CV LOWER ARTERIAL LEFT POST TIBIAL SYS MAX: 153
BH CV LOWER ARTERIAL LEFT TBI RATIO: 0.66
BH CV LOWER ARTERIAL RIGHT ABI RATIO: 0.64
BH CV LOWER ARTERIAL RIGHT CALF RATIO: 0.72
BH CV LOWER ARTERIAL RIGHT DORSALIS PEDIS SYS MAX: 115
BH CV LOWER ARTERIAL RIGHT GREAT TOE SYS MAX: 110
BH CV LOWER ARTERIAL RIGHT HIGH THIGH RATIO: 1
BH CV LOWER ARTERIAL RIGHT HIGH THIGH SYS MAX: 180
BH CV LOWER ARTERIAL RIGHT LOW THIGH RATIO: 0.76
BH CV LOWER ARTERIAL RIGHT LOW THIGH SYS MAX: 136
BH CV LOWER ARTERIAL RIGHT POPLITEAL SYS MAX: 130
BH CV LOWER ARTERIAL RIGHT POST TIBIAL SYS MAX: 105
BH CV LOWER ARTERIAL RIGHT TBI RATIO: 0.61
BH CV VAS BP LEFT ARM: NORMAL MMHG
BH CV XLRA - RV BASE: 4 CM
BH CV XLRA - RV LENGTH: 5.5 CM
BH CV XLRA - RV MID: 2.39 CM
BH CV XLRA - TDI S': 12.7 CM/SEC
MAXIMAL PREDICTED HEART RATE: 159 BPM
MAXIMAL PREDICTED HEART RATE: 159 BPM
STRESS TARGET HR: 135 BPM
STRESS TARGET HR: 135 BPM
UPPER ARTERIAL LEFT ARM BRACHIAL SYS MAX: 180 MMHG
UPPER ARTERIAL RIGHT ARM BRACHIAL SYS MAX: 172 MMHG

## 2022-11-07 RX ORDER — ASPIRIN 81 MG/1
81 TABLET, CHEWABLE ORAL DAILY
Qty: 90 TABLET | Refills: 1 | Status: SHIPPED | OUTPATIENT
Start: 2022-11-07

## 2022-11-07 RX ORDER — AMOXICILLIN 250 MG
1 CAPSULE ORAL DAILY
Qty: 90 TABLET | Refills: 1 | Status: SHIPPED | OUTPATIENT
Start: 2022-11-07

## 2022-11-07 NOTE — TELEPHONE ENCOUNTER
----- Message from Andrew Barry III, MD sent at 11/7/2022  2:09 PM EST -----  Heart function essentially stable compared to previous.  Evidence of mild to moderate blockages in the vessels feeding blood supply to legs.  No further evaluation recommended at this time.  Continue current medical therapy.

## 2022-11-07 NOTE — TELEPHONE ENCOUNTER
Called patient to report message above from . Spouse stated he was unavailable.Notified of message above from . Instructed her to have him call back if he had any questions.

## 2022-12-05 RX ORDER — RIVAROXABAN 20 MG/1
20 TABLET, FILM COATED ORAL
Qty: 30 TABLET | Refills: 5 | Status: SHIPPED | OUTPATIENT
Start: 2022-12-05

## 2022-12-09 ENCOUNTER — LAB (OUTPATIENT)
Dept: LAB | Facility: HOSPITAL | Age: 62
End: 2022-12-09

## 2022-12-09 ENCOUNTER — OFFICE VISIT (OUTPATIENT)
Dept: NEUROLOGY | Facility: CLINIC | Age: 62
End: 2022-12-09

## 2022-12-09 VITALS
HEIGHT: 75 IN | WEIGHT: 142 LBS | BODY MASS INDEX: 17.66 KG/M2 | SYSTOLIC BLOOD PRESSURE: 138 MMHG | HEART RATE: 72 BPM | DIASTOLIC BLOOD PRESSURE: 82 MMHG | OXYGEN SATURATION: 94 %

## 2022-12-09 DIAGNOSIS — R41.3 MEMORY LOSS: Primary | ICD-10-CM

## 2022-12-09 LAB
AMMONIA BLD-SCNC: 18 UMOL/L (ref 16–60)
HIV1+2 AB SER QL: NORMAL

## 2022-12-09 PROCEDURE — 82140 ASSAY OF AMMONIA: CPT | Performed by: PHYSICIAN ASSISTANT

## 2022-12-09 PROCEDURE — 82607 VITAMIN B-12: CPT | Performed by: PHYSICIAN ASSISTANT

## 2022-12-09 PROCEDURE — 99204 OFFICE O/P NEW MOD 45 MIN: CPT | Performed by: PHYSICIAN ASSISTANT

## 2022-12-09 PROCEDURE — 36415 COLL VENOUS BLD VENIPUNCTURE: CPT | Performed by: PHYSICIAN ASSISTANT

## 2022-12-09 PROCEDURE — G0432 EIA HIV-1/HIV-2 SCREEN: HCPCS | Performed by: PHYSICIAN ASSISTANT

## 2022-12-09 PROCEDURE — 87798 DETECT AGENT NOS DNA AMP: CPT | Performed by: PHYSICIAN ASSISTANT

## 2022-12-09 PROCEDURE — 86592 SYPHILIS TEST NON-TREP QUAL: CPT | Performed by: PHYSICIAN ASSISTANT

## 2022-12-09 PROCEDURE — 82746 ASSAY OF FOLIC ACID SERUM: CPT | Performed by: PHYSICIAN ASSISTANT

## 2022-12-09 NOTE — PROGRESS NOTES
"Subjective       Chief Complaint: memory loss      History of Present Illness   Kike Escamilla is a 62 y.o. male who comes to clinic today for evaluation of memory loss . He has noted symptoms since for least several years marked initially by forgetfulness and word-finding difficulties. This has remained static over time. Additional symptoms have included impairments in short term memory. There have been no associated symptoms of anxiety or depression. He denies impairments in ADL's. He manages his medications and finances. He continues to drive.      He notes a remote history of illicit drug use, including mushrooms, LSD, and cocaine as well as EtOH. He has not used any substances for several years.      I have reviewed and confirmed the past family, social and medical history as accurate on 12/9/22.     Review of Systems   Constitutional: Negative.    HENT: Negative.    Eyes: Negative.    Respiratory: Negative.    Cardiovascular: Negative.    Gastrointestinal: Negative.    Endocrine: Negative.    Genitourinary: Negative.    Musculoskeletal: Negative.    Skin: Negative.    Allergic/Immunologic: Negative.    Hematological: Negative.        Objective     /82   Pulse 72   Ht 190.5 cm (75\")   Wt 64.4 kg (142 lb)   SpO2 94%   BMI 17.75 kg/m²     General appearance today is normal.     Physical Exam  Neurological:      Mental Status: He is oriented to person, place, and time.      Cranial Nerves: Cranial nerves 2-12 are intact.      Motor: Motor strength is normal.      Coordination: Finger-Nose-Finger Test and Heel to Shin Test normal.      Gait: Gait is intact.      Deep Tendon Reflexes:      Reflex Scores:       Patellar reflexes are 2+ on the right side and 2+ on the left side.  Psychiatric:         Speech: Speech normal.          Neurologic Exam     Mental Status   Oriented to person, place, and time.   Registration: recalls 3 of 3 objects. Recall at 5 minutes: recalls 3 of 3 objects. Follows 3 step " commands.   Attention: 4/5 sequencing.   Speech: speech is normal   Level of consciousness: alert  Able to name object. Able to read. Able to repeat. Unable to write. Normal comprehension.     Cranial Nerves   Cranial nerves II through XII intact.     Motor Exam   Muscle bulk: normal  Overall muscle tone: normal    Strength   Strength 5/5 throughout.     Sensory Exam   Light touch normal.     Gait, Coordination, and Reflexes     Gait  Gait: normal    Coordination   Finger to nose coordination: normal  Heel to shin coordination: normal    Tremor   Resting tremor: absent    Reflexes   Right patellar: 2+  Left patellar: 2+        Results  MMSE=27      Assessment & Plan   Diagnoses and all orders for this visit:    1. Memory loss (Primary)  -     Vitamin B12  -     Folate  -     Ammonia  -     RPR  -     HIV-1 / O / 2 Ag / Antibody 4th Generation  -     Tropheryma Whipplei PCR  -     MRI Brain Without Contrast; Future          Discussion/Summary   Kike Escamilla comes to clinic today for evaluation of memory loss . I am concerned that his history of chronic pain as well as some of his medications (particularly oxycodone and GBP) are negatively affecting his cognition. It was elected to obtain screening blood work  and an MRI of the brain . After discussing potential treatment options, it was not elected to add any new medications.He will then follow up in 6 months , or sooner if needed.   Total time of visit today: 45 minutes. As part of this visit I reviewed prior lab results. I also discussed diagnosis, prognosis, diagnostic testing, evaluation, current status, treatment options and management as discussed above.         Kristel Joseph PA-C

## 2022-12-10 LAB
FOLATE SERPL-MCNC: 6.54 NG/ML (ref 4.78–24.2)
RPR SER QL: NORMAL
VIT B12 BLD-MCNC: 1173 PG/ML (ref 211–946)

## 2022-12-16 LAB
T WHIPPLEI BY PCR, SOURCE: NORMAL
TROPHERYMA WHIPPLEI PCR: NOT DETECTED

## 2022-12-20 ENCOUNTER — OFFICE VISIT (OUTPATIENT)
Dept: INTERNAL MEDICINE | Facility: CLINIC | Age: 62
End: 2022-12-20

## 2022-12-20 VITALS
DIASTOLIC BLOOD PRESSURE: 80 MMHG | SYSTOLIC BLOOD PRESSURE: 158 MMHG | OXYGEN SATURATION: 98 % | WEIGHT: 141 LBS | HEIGHT: 73 IN | HEART RATE: 69 BPM | RESPIRATION RATE: 24 BRPM | TEMPERATURE: 97.3 F | BODY MASS INDEX: 18.69 KG/M2

## 2022-12-20 DIAGNOSIS — Z12.11 SCREENING FOR COLON CANCER: ICD-10-CM

## 2022-12-20 DIAGNOSIS — Z23 NEED FOR COVID-19 VACCINE: ICD-10-CM

## 2022-12-20 DIAGNOSIS — Z23 NEED FOR INFLUENZA VACCINATION: ICD-10-CM

## 2022-12-20 DIAGNOSIS — I25.10 CORONARY ARTERY DISEASE INVOLVING NATIVE CORONARY ARTERY OF NATIVE HEART WITHOUT ANGINA PECTORIS: ICD-10-CM

## 2022-12-20 DIAGNOSIS — Z00.00 PHYSICAL EXAM, ANNUAL: Primary | ICD-10-CM

## 2022-12-20 DIAGNOSIS — I10 ESSENTIAL HYPERTENSION: ICD-10-CM

## 2022-12-20 DIAGNOSIS — Q27.30 AVM (ARTERIOVENOUS MALFORMATION): ICD-10-CM

## 2022-12-20 DIAGNOSIS — J43.1 PANLOBULAR EMPHYSEMA: ICD-10-CM

## 2022-12-20 DIAGNOSIS — M54.16 LUMBAR RADICULOPATHY: ICD-10-CM

## 2022-12-20 DIAGNOSIS — E78.2 MIXED HYPERLIPIDEMIA: ICD-10-CM

## 2022-12-20 PROCEDURE — 91312 COVID-19 (PFIZER) BIVALENT BOOSTER 12+YRS: CPT | Performed by: NURSE PRACTITIONER

## 2022-12-20 PROCEDURE — 0124A PR ADM SARSCOV2 30MCG/0.3ML BST: CPT | Performed by: NURSE PRACTITIONER

## 2022-12-20 PROCEDURE — 90471 IMMUNIZATION ADMIN: CPT | Performed by: NURSE PRACTITIONER

## 2022-12-20 PROCEDURE — 99396 PREV VISIT EST AGE 40-64: CPT | Performed by: NURSE PRACTITIONER

## 2022-12-20 PROCEDURE — 90686 IIV4 VACC NO PRSV 0.5 ML IM: CPT | Performed by: NURSE PRACTITIONER

## 2022-12-20 NOTE — PROGRESS NOTES
Subjective   Kike Escamilla is a 62 y.o. male    Chief Complaint   Patient presents with   • Annual Exam   • Flu Vaccine   • covid vaccine     History of Present Illness     Here for annual    CAD - h/o MI, unsure of date.  States that he knows he was followed previously by [facility].  At last visit he requested a referral to [facility] cardiology.  He has since seen and been evaluated per Dr. Barry whom is now following him.  He has had an echo and arterial duplex scans.  No changes made.  Continue medical management.     Emphysema/COPD-now followed by pulmonology.  Was found to have AVM needing embolization on recent CT scan.     HL - chronic and stable on Lipitor 80 mg daily.   Lab Results   Component Value Date    CHOL 132 08/26/2022    TRIG 60 08/26/2022    HDL 38 (L) 08/26/2022    LDL 81 08/26/2022     Chronic Pain - managed by [facility] pain; states that pain is well controlled on current regimen     Memory loss-referred to neurology at last visit.  Has seen and been evaluated.  They were concerned with his pain medication regimen.  It was elected to have him follow-up in 6 months.    COVID-updating booster today  Flu-updating today  Tdap-12/6/2021  Prevnar 13 - 11/2/2020  Pneumovax-12/6/2021  Shingrix-never, did not have chicken pox  PSA-unknown  Colonoscopy-unknown; will order cologuard  Hep C screen-never    Diet-trying to make healthier choices    Exercise-none    Social History     Tobacco Use   • Smoking status: Every Day     Packs/day: 0.25     Years: 40.00     Pack years: 10.00     Types: Cigarettes     Start date: 1/1/1975   • Smokeless tobacco: Never   • Tobacco comments:     4-5 cigarettes every morning with his coffee   Vaping Use   • Vaping Use: Never used   Substance Use Topics   • Alcohol use: Not Currently   • Drug use: Never         The following portions of the patient's history were reviewed and updated as appropriate: allergies, current medications, past family history, past medical  history, past social history, past surgical history and problem list.    Current Outpatient Medications:   •  albuterol sulfate  (90 Base) MCG/ACT inhaler, Inhale 2 puffs Every 4 (Four) Hours As Needed for Wheezing., Disp: 18 g, Rfl: 11  •  aspirin 81 MG chewable tablet, Chew 1 tablet Daily., Disp: 90 tablet, Rfl: 1  •  atorvastatin (LIPITOR) 80 MG tablet, Take 80 mg by mouth Daily., Disp: , Rfl:   •  cyanocobalamin (VITAMIN B-12) 1000 MCG tablet, Take 1 tablet by mouth Daily., Disp: 90 tablet, Rfl: 1  •  gabapentin (NEURONTIN) 300 MG capsule, Take 300 mg by mouth 3 (Three) Times a Day., Disp: , Rfl:   •  hydrOXYzine (ATARAX) 25 MG tablet, hydroxyzine HCl 25 mg tablet  Take by oral route., Disp: , Rfl:   •  nicotine (NICODERM CQ) 21 MG/24HR patch, Place 1 patch on the skin as directed by provider Daily., Disp: 28 each, Rfl: 11  •  oxyCODONE-acetaminophen (PERCOCET)  MG per tablet, Take 1 tablet by mouth Every 6 (Six) Hours As Needed., Disp: , Rfl:   •  pantoprazole (PROTONIX) 40 MG EC tablet, Take 40 mg by mouth Daily., Disp: , Rfl:   •  sennosides-docusate (PERICOLACE) 8.6-50 MG per tablet, Take 1 tablet by mouth Daily., Disp: 90 tablet, Rfl: 1  •  umeclidinium-vilanterol (Anoro Ellipta) 62.5-25 MCG/INH aerosol powder  inhaler, Inhale 1 puff Daily., Disp: 60 each, Rfl: 11  •  Xarelto 20 MG tablet, Take 1 tablet by mouth Daily With Dinner., Disp: 30 tablet, Rfl: 5     Review of Systems   Constitutional: Negative for appetite change, chills, fatigue, fever and unexpected weight change.   HENT: Negative for congestion, ear pain, nosebleeds, rhinorrhea and tinnitus.    Eyes: Negative for pain.   Respiratory: Negative for cough, chest tightness and shortness of breath.    Cardiovascular: Negative for chest pain, palpitations and leg swelling.   Gastrointestinal: Negative for abdominal distention, abdominal pain, blood in stool, constipation, diarrhea, nausea and vomiting.   Endocrine: Negative for cold  intolerance, heat intolerance, polydipsia, polyphagia and polyuria.   Genitourinary: Negative for dysuria, flank pain, frequency, hematuria and urgency.   Musculoskeletal: Negative for arthralgias, back pain, gait problem, joint swelling, myalgias and neck pain.   Skin: Negative for color change, pallor, rash and wound.   Allergic/Immunologic: Negative for environmental allergies and food allergies.   Neurological: Negative for dizziness, syncope, weakness, light-headedness, numbness and headaches.   Hematological: Negative for adenopathy. Does not bruise/bleed easily.   Psychiatric/Behavioral: Negative for behavioral problems and suicidal ideas. The patient is not nervous/anxious.        Objective   Physical Exam  Constitutional:       Appearance: He is well-developed and normal weight.   HENT:      Head: Normocephalic and atraumatic.      Right Ear: External ear normal.      Left Ear: External ear normal.      Nose: Nose normal.      Mouth/Throat:      Mouth: Mucous membranes are moist.      Pharynx: Oropharynx is clear.   Eyes:      General: Lids are normal.      Conjunctiva/sclera: Conjunctivae normal.      Pupils: Pupils are equal, round, and reactive to light.   Neck:      Vascular: No carotid bruit.   Cardiovascular:      Rate and Rhythm: Normal rate and regular rhythm.      Pulses: Normal pulses.      Heart sounds: Normal heart sounds. No murmur heard.  Pulmonary:      Effort: Pulmonary effort is normal.      Breath sounds: Normal breath sounds.   Abdominal:      General: Bowel sounds are normal.      Palpations: Abdomen is soft. There is no hepatomegaly or splenomegaly.      Hernia: No hernia is present.   Musculoskeletal:         General: Normal range of motion.      Cervical back: Normal range of motion and neck supple.   Lymphadenopathy:      Cervical: No cervical adenopathy.   Skin:     General: Skin is warm and dry.      Capillary Refill: Capillary refill takes less than 2 seconds.   Neurological:       "Mental Status: He is alert and oriented to person, place, and time.      Deep Tendon Reflexes: Reflexes are normal and symmetric.   Psychiatric:         Mood and Affect: Mood normal.         Behavior: Behavior normal.         Thought Content: Thought content normal.         Judgment: Judgment normal.       Vitals:    12/20/22 1433   BP: 158/80   Cuff Size: Adult   Pulse: 69   Resp: 24   Temp: 97.3 °F (36.3 °C)   TempSrc: Infrared   SpO2: 98%   Weight: 64 kg (141 lb)   Height: 185.4 cm (73\")         Assessment & Plan   Diagnoses and all orders for this visit:    1. Physical exam, annual (Primary)    2. Need for influenza vaccination  -     FluLaval/Fluzone >6 mos (7067-6981)    3. Need for COVID-19 vaccine  -     COVID-19 Bivalent Booster (Pfizer) 12+yrs    4. Screening for colon cancer  -     Cologuard - Stool, Per Rectum; Future    5. Coronary artery disease involving native coronary artery of native heart without angina pectoris    6. Essential hypertension    7. Mixed hyperlipidemia    8. Right lower lobe AVM    9. Lumbar radiculopathy    10. Panlobular emphysema (HCC)      Pt refuses HTN treatment  Flu and COVID vaccines updated  No refills needed  Keep close f/u with specialists as scheduled  Declines smoking cessation, no desire to quit  Counseling - diet and exercise  Return in about 6 months (around 6/20/2023) for f/u.             "

## 2023-01-31 RX ORDER — HYDROXYZINE HYDROCHLORIDE 25 MG/1
25 TABLET, FILM COATED ORAL EVERY 8 HOURS PRN
Qty: 90 TABLET | Refills: 0 | Status: SHIPPED | OUTPATIENT
Start: 2023-01-31 | End: 2023-04-03

## 2023-02-07 RX ORDER — ATORVASTATIN CALCIUM 80 MG/1
80 TABLET, FILM COATED ORAL DAILY
Qty: 90 TABLET | Refills: 1 | Status: SHIPPED | OUTPATIENT
Start: 2023-02-07

## 2023-02-07 NOTE — TELEPHONE ENCOUNTER
Rx Refill Note  Requested Prescriptions     Pending Prescriptions Disp Refills   • atorvastatin (LIPITOR) 80 MG tablet 90 tablet      Sig: Take 1 tablet by mouth Daily.      Last filled: 08/03/2022 - Historical Provider   Last office visit with prescribing clinician: 12/20/2022      Next office visit with prescribing clinician: 6/20/2023 April ELLEN Mcconnell MA  02/07/23, 16:20 EST     Please advise, medication has not been filled by you before.

## 2023-04-03 RX ORDER — HYDROXYZINE HYDROCHLORIDE 25 MG/1
TABLET, FILM COATED ORAL
Qty: 90 TABLET | Refills: 0 | Status: SHIPPED | OUTPATIENT
Start: 2023-04-03

## 2023-04-11 RX ORDER — ASPIRIN 81 MG
TABLET,CHEWABLE ORAL
Qty: 90 TABLET | Refills: 1 | Status: SHIPPED | OUTPATIENT
Start: 2023-04-11

## 2023-06-08 RX ORDER — RIVAROXABAN 20 MG/1
TABLET, FILM COATED ORAL
Qty: 90 TABLET | Refills: 1 | Status: SHIPPED | OUTPATIENT
Start: 2023-06-08

## 2023-07-25 ENCOUNTER — TELEPHONE (OUTPATIENT)
Dept: INTERNAL MEDICINE | Facility: CLINIC | Age: 63
End: 2023-07-25

## 2023-07-25 NOTE — TELEPHONE ENCOUNTER
Caller: Kike Escamilla    Relationship: Self    Best call back number: [phone]     What specialty or service is being requested: NEUROLOGY    What is the provider, practice or medical service name: DR. HOANG    What is the office location: [facility]    Any additional details: THEY ARE NEEDING THE REFERRAL AND THE MEDICAL RECORDS. HE SAID HE WAS SCHEDULED BUT THEY NEVER RECEIVED THIS. PLEASE SEND THIS. CAN SOMEONE CHECK IF HE IS STILL ON THE SCHEDULE FOR THEM? THE PATIENT WAS TOLD THEY TOOK HIM OFF THE SCHEDULE.     PLEASE CALL PATIENT WHEN THIS IS TAKEN CARE OF SO HE CAN FOLLOW

## 2023-08-23 ENCOUNTER — TELEPHONE (OUTPATIENT)
Dept: INTERNAL MEDICINE | Facility: CLINIC | Age: 63
End: 2023-08-23

## 2023-08-23 NOTE — TELEPHONE ENCOUNTER
----- Message from HYACINTH Damon sent at 8/22/2023 12:37 PM EDT -----  EMG/nerve conduction studies show moderate to severe neuropathy affecting the legs.  There is also ulnar neuropathy at the elbow which is moderate.  I see that he is scheduled to see neurologist  Weekly on 9/26/2023.  We will see what his evaluation shows.

## 2023-08-23 NOTE — TELEPHONE ENCOUNTER
L/m HUB CAN READ  ----- Message from HYACINTH Damon sent at 8/22/2023 12:37 PM EDT -----  EMG/nerve conduction studies show moderate to severe neuropathy affecting the legs.  There is also ulnar neuropathy at the elbow which is moderate.  I see that he is scheduled to see neurologist  Weekly on 9/26/2023.  We will see what his evaluation shows.

## 2023-08-24 ENCOUNTER — TELEPHONE (OUTPATIENT)
Dept: INTERNAL MEDICINE | Facility: CLINIC | Age: 63
End: 2023-08-24
Payer: MEDICAID

## 2023-08-24 NOTE — TELEPHONE ENCOUNTER
[employer] message sent regarding EMG/nerve conduction studies show moderate to severe neuropathy affecting the legs.  There is also ulnar neuropathy at the elbow which is moderate.  I see that he is scheduled to see neurologist  Weekly on 9/26/2023.  We will see what his evaluation shows.

## 2023-08-31 ENCOUNTER — TELEPHONE (OUTPATIENT)
Dept: INTERNAL MEDICINE | Facility: CLINIC | Age: 63
End: 2023-08-31
Payer: MEDICAID

## 2023-08-31 NOTE — TELEPHONE ENCOUNTER
Regarding: EMG/nerve conduction  ----- Message from Altagracia Orona MA sent at 8/29/2023 10:18 AM EDT -----       ----- Message sent from Brooke Swanson MA to Kike Escamilla at 8/24/2023  2:43 PM -----   EMG/nerve conduction studies show moderate to severe neuropathy affecting the legs.  There is also ulnar neuropathy at the elbow which is moderate.  I see that he is scheduled to see neurologist  Weekly on 9/26/2023.  We will see what his evaluation shows.

## 2023-08-31 NOTE — TELEPHONE ENCOUNTER
Left message on voicemail asking patient to return call to go over results or he can read his my chart message.

## 2023-09-15 ENCOUNTER — LAB (OUTPATIENT)
Dept: INTERNAL MEDICINE | Facility: CLINIC | Age: 63
End: 2023-09-15
Payer: MEDICAID

## 2023-09-15 DIAGNOSIS — Z12.5 SCREENING FOR PROSTATE CANCER: ICD-10-CM

## 2023-09-15 DIAGNOSIS — I10 ESSENTIAL HYPERTENSION: ICD-10-CM

## 2023-09-15 DIAGNOSIS — I48.0 PAROXYSMAL ATRIAL FIBRILLATION: ICD-10-CM

## 2023-09-15 DIAGNOSIS — G63 POLYNEUROPATHY ASSOCIATED WITH UNDERLYING DISEASE: ICD-10-CM

## 2023-09-15 DIAGNOSIS — R73.9 HYPERGLYCEMIA: ICD-10-CM

## 2023-09-15 DIAGNOSIS — Q27.30 AVM (ARTERIOVENOUS MALFORMATION): ICD-10-CM

## 2023-09-15 DIAGNOSIS — J43.1 PANLOBULAR EMPHYSEMA: ICD-10-CM

## 2023-09-15 DIAGNOSIS — I73.9 PAD (PERIPHERAL ARTERY DISEASE): ICD-10-CM

## 2023-09-15 DIAGNOSIS — I25.10 CORONARY ARTERY DISEASE INVOLVING NATIVE CORONARY ARTERY OF NATIVE HEART WITHOUT ANGINA PECTORIS: ICD-10-CM

## 2023-09-15 DIAGNOSIS — R41.3 MEMORY DEFICIT: ICD-10-CM

## 2023-09-15 DIAGNOSIS — E55.9 VITAMIN D DEFICIENCY: ICD-10-CM

## 2023-09-15 DIAGNOSIS — E78.2 MIXED HYPERLIPIDEMIA: ICD-10-CM

## 2023-09-15 DIAGNOSIS — M54.16 LUMBAR RADICULOPATHY: ICD-10-CM

## 2023-09-15 PROBLEM — Z87.891 PERSONAL HISTORY OF SMOKING: Status: ACTIVE | Noted: 2020-12-03

## 2023-09-15 LAB
ALBUMIN SERPL-MCNC: 4.6 G/DL (ref 3.5–5.2)
ALBUMIN/GLOB SERPL: 1.7 G/DL
ALP SERPL-CCNC: 103 U/L (ref 39–117)
ALT SERPL W P-5'-P-CCNC: 11 U/L (ref 1–41)
ANION GAP SERPL CALCULATED.3IONS-SCNC: 12.2 MMOL/L (ref 5–15)
AST SERPL-CCNC: 18 U/L (ref 1–40)
BASOPHILS # BLD AUTO: 0.04 10*3/MM3 (ref 0–0.2)
BASOPHILS NFR BLD AUTO: 0.6 % (ref 0–1.5)
BILIRUB SERPL-MCNC: 0.4 MG/DL (ref 0–1.2)
BUN SERPL-MCNC: 18 MG/DL (ref 8–23)
BUN/CREAT SERPL: 13.3 (ref 7–25)
CALCIUM SPEC-SCNC: 9.4 MG/DL (ref 8.6–10.5)
CHLORIDE SERPL-SCNC: 104 MMOL/L (ref 98–107)
CHOLEST SERPL-MCNC: 119 MG/DL (ref 0–200)
CO2 SERPL-SCNC: 24.8 MMOL/L (ref 22–29)
CREAT SERPL-MCNC: 1.35 MG/DL (ref 0.76–1.27)
DEPRECATED RDW RBC AUTO: 46.6 FL (ref 37–54)
EGFRCR SERPLBLD CKD-EPI 2021: 59.4 ML/MIN/1.73
EOSINOPHIL # BLD AUTO: 0.05 10*3/MM3 (ref 0–0.4)
EOSINOPHIL NFR BLD AUTO: 0.8 % (ref 0.3–6.2)
ERYTHROCYTE [DISTWIDTH] IN BLOOD BY AUTOMATED COUNT: 14.4 % (ref 12.3–15.4)
GLOBULIN UR ELPH-MCNC: 2.7 GM/DL
GLUCOSE SERPL-MCNC: 75 MG/DL (ref 65–99)
HBA1C MFR BLD: 5.9 % (ref 4.8–5.6)
HCT VFR BLD AUTO: 38.4 % (ref 37.5–51)
HDLC SERPL-MCNC: 35 MG/DL (ref 40–60)
HGB BLD-MCNC: 12.7 G/DL (ref 13–17.7)
IMM GRANULOCYTES # BLD AUTO: 0.02 10*3/MM3 (ref 0–0.05)
IMM GRANULOCYTES NFR BLD AUTO: 0.3 % (ref 0–0.5)
LDLC SERPL CALC-MCNC: 69 MG/DL (ref 0–100)
LDLC/HDLC SERPL: 1.97 {RATIO}
LYMPHOCYTES # BLD AUTO: 2.1 10*3/MM3 (ref 0.7–3.1)
LYMPHOCYTES NFR BLD AUTO: 32 % (ref 19.6–45.3)
MCH RBC QN AUTO: 29.3 PG (ref 26.6–33)
MCHC RBC AUTO-ENTMCNC: 33.1 G/DL (ref 31.5–35.7)
MCV RBC AUTO: 88.5 FL (ref 79–97)
MONOCYTES # BLD AUTO: 0.48 10*3/MM3 (ref 0.1–0.9)
MONOCYTES NFR BLD AUTO: 7.3 % (ref 5–12)
NEUTROPHILS NFR BLD AUTO: 3.87 10*3/MM3 (ref 1.7–7)
NEUTROPHILS NFR BLD AUTO: 59 % (ref 42.7–76)
NRBC BLD AUTO-RTO: 0 /100 WBC (ref 0–0.2)
PLATELET # BLD AUTO: 166 10*3/MM3 (ref 140–450)
PMV BLD AUTO: 11.5 FL (ref 6–12)
POTASSIUM SERPL-SCNC: 4.4 MMOL/L (ref 3.5–5.2)
PROT SERPL-MCNC: 7.3 G/DL (ref 6–8.5)
RBC # BLD AUTO: 4.34 10*6/MM3 (ref 4.14–5.8)
SODIUM SERPL-SCNC: 141 MMOL/L (ref 136–145)
TRIGL SERPL-MCNC: 76 MG/DL (ref 0–150)
TSH SERPL DL<=0.05 MIU/L-ACNC: 4.51 UIU/ML (ref 0.27–4.2)
VLDLC SERPL-MCNC: 15 MG/DL (ref 5–40)
WBC NRBC COR # BLD: 6.56 10*3/MM3 (ref 3.4–10.8)

## 2023-09-15 PROCEDURE — 82607 VITAMIN B-12: CPT | Performed by: NURSE PRACTITIONER

## 2023-09-15 PROCEDURE — 83036 HEMOGLOBIN GLYCOSYLATED A1C: CPT | Performed by: NURSE PRACTITIONER

## 2023-09-15 PROCEDURE — 85025 COMPLETE CBC W/AUTO DIFF WBC: CPT | Performed by: NURSE PRACTITIONER

## 2023-09-15 PROCEDURE — 84443 ASSAY THYROID STIM HORMONE: CPT | Performed by: NURSE PRACTITIONER

## 2023-09-15 PROCEDURE — 80061 LIPID PANEL: CPT | Performed by: NURSE PRACTITIONER

## 2023-09-15 PROCEDURE — 80053 COMPREHEN METABOLIC PANEL: CPT | Performed by: NURSE PRACTITIONER

## 2023-09-15 PROCEDURE — 82306 VITAMIN D 25 HYDROXY: CPT | Performed by: NURSE PRACTITIONER

## 2023-09-15 PROCEDURE — 36415 COLL VENOUS BLD VENIPUNCTURE: CPT | Performed by: NURSE PRACTITIONER

## 2023-09-15 PROCEDURE — G0103 PSA SCREENING: HCPCS | Performed by: NURSE PRACTITIONER

## 2023-09-16 LAB
25(OH)D3 SERPL-MCNC: 34.5 NG/ML (ref 30–100)
PSA SERPL-MCNC: 1.57 NG/ML (ref 0–4)
VIT B12 BLD-MCNC: 675 PG/ML (ref 211–946)

## 2023-09-25 DIAGNOSIS — J43.1 PANLOBULAR EMPHYSEMA: ICD-10-CM

## 2023-09-25 RX ORDER — ALBUTEROL SULFATE 90 UG/1
AEROSOL, METERED RESPIRATORY (INHALATION)
Qty: 18 G | Refills: 11 | Status: SHIPPED | OUTPATIENT
Start: 2023-09-25

## 2023-09-26 RX ORDER — HYDROXYZINE HYDROCHLORIDE 25 MG/1
TABLET, FILM COATED ORAL
Qty: 90 TABLET | Refills: 0 | Status: SHIPPED | OUTPATIENT
Start: 2023-09-26

## 2023-10-02 ENCOUNTER — OFFICE VISIT (OUTPATIENT)
Dept: NEUROLOGY | Facility: CLINIC | Age: 63
End: 2023-10-02
Payer: MEDICARE

## 2023-10-02 VITALS — SYSTOLIC BLOOD PRESSURE: 138 MMHG | HEART RATE: 56 BPM | OXYGEN SATURATION: 96 % | DIASTOLIC BLOOD PRESSURE: 64 MMHG

## 2023-10-02 DIAGNOSIS — R41.3 MEMORY DEFICIT: ICD-10-CM

## 2023-10-02 DIAGNOSIS — G63 POLYNEUROPATHY ASSOCIATED WITH UNDERLYING DISEASE: Primary | ICD-10-CM

## 2023-10-02 DIAGNOSIS — M54.16 LUMBAR RADICULOPATHY: ICD-10-CM

## 2023-10-02 PROBLEM — R29.6 FREQUENT FALLS: Status: ACTIVE | Noted: 2023-10-02

## 2023-10-02 PROBLEM — I70.213 ATHEROSCLEROSIS OF NATIVE ARTERY OF BOTH LOWER EXTREMITIES WITH INTERMITTENT CLAUDICATION: Status: ACTIVE | Noted: 2023-10-02

## 2023-10-02 PROBLEM — J44.9 CHRONIC OBSTRUCTIVE PULMONARY DISEASE (COPD): Status: ACTIVE | Noted: 2023-10-02

## 2023-10-02 PROBLEM — Z79.01 ON ANTICOAGULANT THERAPY: Status: ACTIVE | Noted: 2023-10-02

## 2023-10-02 PROBLEM — I25.2 HISTORY OF MI (MYOCARDIAL INFARCTION): Status: ACTIVE | Noted: 2023-10-02

## 2023-10-02 PROBLEM — E65 ABDOMINAL OBESITY: Status: ACTIVE | Noted: 2023-10-02

## 2023-10-02 PROBLEM — F17.219 CIGARETTE NICOTINE DEPENDENCE WITH NICOTINE-INDUCED DISORDER: Status: ACTIVE | Noted: 2023-10-02

## 2023-10-02 PROBLEM — Z91.81 AT LOW RISK FOR FALL: Status: ACTIVE | Noted: 2023-10-02

## 2023-10-02 PROBLEM — Z79.02 ENCOUNTER FOR LONG-TERM USE OF ANTIPLATELETS/ANTITHROMBOTICS: Status: ACTIVE | Noted: 2023-10-02

## 2023-10-02 PROBLEM — R20.0 BILATERAL LEG NUMBNESS: Status: ACTIVE | Noted: 2023-10-02

## 2023-10-02 PROCEDURE — 3075F SYST BP GE 130 - 139MM HG: CPT | Performed by: PSYCHIATRY & NEUROLOGY

## 2023-10-02 PROCEDURE — 3078F DIAST BP <80 MM HG: CPT | Performed by: PSYCHIATRY & NEUROLOGY

## 2023-10-02 PROCEDURE — 99214 OFFICE O/P EST MOD 30 MIN: CPT | Performed by: PSYCHIATRY & NEUROLOGY

## 2023-10-02 PROCEDURE — 1160F RVW MEDS BY RX/DR IN RCRD: CPT | Performed by: PSYCHIATRY & NEUROLOGY

## 2023-10-02 PROCEDURE — 1159F MED LIST DOCD IN RCRD: CPT | Performed by: PSYCHIATRY & NEUROLOGY

## 2023-10-02 RX ORDER — SENNOSIDES A AND B 8.6 MG/1
TABLET, FILM COATED ORAL AS NEEDED
COMMUNITY

## 2023-10-02 NOTE — PROGRESS NOTES
"Chief Complaint    Peripheral Neuropathy    Subjective        Kike Escamilla presents to [facility] NEUROLOGY  History of Present Illness    63 y.o. male referred by Dr Reyna Aleman for peripheral neuropathy.    ETOH usage 2 bottles a whiskey week.  Started at 15 yoa. Stopped in last few years.      Takes oxycodone for back pain.      Unsure of memory problems.      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 Currently with chronic bilateral lower extremity neuropathic pain and potential limiting claudication.      Chronic pain managed by [facility].    Objective   Vital Signs:  /64   Pulse 56   SpO2 96%   Estimated body mass index is 18.6 kg/m² as calculated from the following:    Height as of 12/20/22: 185.4 cm (73\").    Weight as of 12/20/22: 64 kg (141 lb).       BMI is within normal parameters. No other follow-up for BMI required.    Neurologic Exam     Mental Status   Oriented to person, place, and time.   Speech: speech is normal   Level of consciousness: alert  Knowledge: good and consistent with education.   Normal comprehension.     Cranial Nerves   Cranial nerves II through XII intact.     CN II   Visual fields full to confrontation.   Visual acuity: normal  Right visual field deficit: none  Left visual field deficit: none     CN III, IV, VI   Pupils are equal, round, and reactive to light.  Extraocular motions are normal.   Nystagmus: none   Diplopia: none  Ophthalmoparesis: none  Upgaze: normal  Downgaze: normal  Conjugate gaze: present    CN V   Facial sensation intact.   Right corneal reflex: normal  Left corneal reflex: normal    CN VII   Right facial weakness: none  Left facial weakness: " none    CN VIII   Hearing: intact    CN IX, X   Palate: symmetric  Right gag reflex: normal  Left gag reflex: normal    CN XI   Right sternocleidomastoid strength: normal  Left sternocleidomastoid strength: normal    CN XII   Tongue: not atrophic  Fasciculations: absent  Tongue deviation: none    Motor Exam   Muscle bulk: normal  Overall muscle tone: normal    Strength   Strength 5/5 throughout.     Sensory Exam   Right leg light touch: decreased from knee  Left leg light touch: decreased from knee  Right leg pinprick: decreased from knee  Left leg pinprick: decreased from knee    Gait, Coordination, and Reflexes     Gait  Gait: normal and shuffling    Tremor   Resting tremor: absent  Intention tremor: absent  Action tremor: absent    Reflexes   Right patellar: 0  Left patellar: 0  Right achilles: 0  Left achilles: 0   Physical Exam  Eyes:      Extraocular Movements: EOM normal.      Pupils: Pupils are equal, round, and reactive to light.   Neurological:      Mental Status: He is oriented to person, place, and time.      Cranial Nerves: Cranial nerves 2-12 are intact.      Motor: Motor strength is normal.      Gait: Gait is intact.      Deep Tendon Reflexes:      Reflex Scores:       Patellar reflexes are 0 on the right side and 0 on the left side.       Achilles reflexes are 0 on the right side and 0 on the left side.  Psychiatric:         Speech: Speech normal.      Result Review :  The following data was reviewed by: Alex King MD on 10/02/2023:  Common labs          9/15/2023    15:49   Common Labs   Glucose 75    BUN 18    Creatinine 1.35    Sodium 141    Potassium 4.4    Chloride 104    Calcium 9.4    Albumin 4.6    Total Bilirubin 0.4    Alkaline Phosphatase 103    AST (SGOT) 18    ALT (SGPT) 11    WBC 6.56    Hemoglobin 12.7    Hematocrit 38.4    Platelets 166    Total Cholesterol 119    Triglycerides 76    HDL Cholesterol 35    LDL Cholesterol  69    Hemoglobin A1C 5.90    PSA 1.570                    Assessment and Plan   Diagnoses and all orders for this visit:    1. Polyneuropathy associated with underlying disease (Primary)  Assessment & Plan:  Secondary to chronic ETOH usage       2. Memory deficit  Assessment & Plan:  Secondary to polypharmacy of GBP, Oxycodone, hydroxyzine      3. Lumbar radiculopathy             Follow Up   No follow-ups on file.  Patient was given instructions and counseling regarding his condition or for health maintenance advice. Please see specific information pulled into the AVS if appropriate.

## 2023-10-13 RX ORDER — ATORVASTATIN CALCIUM 80 MG/1
80 TABLET, FILM COATED ORAL DAILY
Qty: 90 TABLET | Refills: 0 | Status: SHIPPED | OUTPATIENT
Start: 2023-10-13

## 2023-10-19 ENCOUNTER — LAB (OUTPATIENT)
Dept: INTERNAL MEDICINE | Facility: CLINIC | Age: 63
End: 2023-10-19
Payer: MEDICARE

## 2023-10-19 ENCOUNTER — OFFICE VISIT (OUTPATIENT)
Dept: INTERNAL MEDICINE | Facility: CLINIC | Age: 63
End: 2023-10-19
Payer: MEDICARE

## 2023-10-19 DIAGNOSIS — I73.9 PAD (PERIPHERAL ARTERY DISEASE): ICD-10-CM

## 2023-10-19 DIAGNOSIS — J44.9 CHRONIC OBSTRUCTIVE PULMONARY DISEASE, UNSPECIFIED COPD TYPE: ICD-10-CM

## 2023-10-19 DIAGNOSIS — E78.2 MIXED HYPERLIPIDEMIA: ICD-10-CM

## 2023-10-19 DIAGNOSIS — Z79.891 LONG-TERM CURRENT USE OF OPIATE ANALGESIC: ICD-10-CM

## 2023-10-19 DIAGNOSIS — G89.29 CHRONIC LOW BACK PAIN, UNSPECIFIED BACK PAIN LATERALITY, UNSPECIFIED WHETHER SCIATICA PRESENT: ICD-10-CM

## 2023-10-19 DIAGNOSIS — R94.6 ABNORMAL THYROID FUNCTION TEST: ICD-10-CM

## 2023-10-19 DIAGNOSIS — F17.200 TOBACCO DEPENDENCE: ICD-10-CM

## 2023-10-19 DIAGNOSIS — I10 ESSENTIAL HYPERTENSION: ICD-10-CM

## 2023-10-19 DIAGNOSIS — I25.10 CORONARY ARTERY DISEASE INVOLVING NATIVE CORONARY ARTERY OF NATIVE HEART WITHOUT ANGINA PECTORIS: ICD-10-CM

## 2023-10-19 DIAGNOSIS — Z23 NEEDS FLU SHOT: ICD-10-CM

## 2023-10-19 DIAGNOSIS — I25.10 CORONARY ARTERY DISEASE INVOLVING NATIVE CORONARY ARTERY OF NATIVE HEART WITHOUT ANGINA PECTORIS: Primary | ICD-10-CM

## 2023-10-19 DIAGNOSIS — M54.50 CHRONIC LOW BACK PAIN, UNSPECIFIED BACK PAIN LATERALITY, UNSPECIFIED WHETHER SCIATICA PRESENT: ICD-10-CM

## 2023-10-19 PROCEDURE — 80048 BASIC METABOLIC PNL TOTAL CA: CPT | Performed by: NURSE PRACTITIONER

## 2023-10-19 PROCEDURE — 84439 ASSAY OF FREE THYROXINE: CPT | Performed by: NURSE PRACTITIONER

## 2023-10-19 PROCEDURE — 36415 COLL VENOUS BLD VENIPUNCTURE: CPT | Performed by: NURSE PRACTITIONER

## 2023-10-19 PROCEDURE — G0008 ADMIN INFLUENZA VIRUS VAC: HCPCS | Performed by: NURSE PRACTITIONER

## 2023-10-19 PROCEDURE — 84481 FREE ASSAY (FT-3): CPT | Performed by: NURSE PRACTITIONER

## 2023-10-19 PROCEDURE — 99214 OFFICE O/P EST MOD 30 MIN: CPT | Performed by: NURSE PRACTITIONER

## 2023-10-19 PROCEDURE — 84443 ASSAY THYROID STIM HORMONE: CPT | Performed by: NURSE PRACTITIONER

## 2023-10-19 PROCEDURE — 90686 IIV4 VACC NO PRSV 0.5 ML IM: CPT | Performed by: NURSE PRACTITIONER

## 2023-10-19 NOTE — PROGRESS NOTES
Subjective   Kike Escamilla is a 63 y.o. male    Chief Complaint   Patient presents with    Coronary artery disease involving native coronary artery of     Patient in clinic for follow up     Hyperlipidemia     Patient in clinic for follow up      History of Present Illness     Peripheral vascular disease: History of aortoiliac stenting with acute occlusion in August 2021 requiring emergent surgical thrombectomy and 4 compartment fasciotomies of the lower extremity. Currently with chronic bilateral lower extremity neuropathic pain and potential limiting claudication.      CAD - h/o MI, unsure of date.  States that he knows he was followed previously by [facility].  He requested a referral to [facility] cardiology.  He has since been seen and been evaluated per Dr. Barry whom is now following him.  He had an echo and arterial duplex scan last fall.  No changes made. Continued medical management.     Emphysema/COPD-now followed by pulmonology.  Was found to have AVM needing embolization on recent CT scan.  This has not been done and he has not followed up with Pulm in recent months.  Had appt yesterday, but slept through his appt.  Current regimen is Trelegy and PRN Albuterol     HL - chronic and stable on Lipitor 80 mg daily.   Lab Results   Component Value Date    CHOL 119 09/15/2023    TRIG 76 09/15/2023    HDL 35 (L) 09/15/2023    LDL 69 09/15/2023      Chronic Pain - managed by [facility] pain; states that pain is well controlled on current regimen     Memory loss-referred to neurology.  Has seen and been evaluated.  They were concerned with his pain medication regimen and h/o ETOH use     COVID-12/22  Flu-today  Tdap-12/6/2021  Prevnar 13 - 11/2/2020  Pneumovax-12/6/2021  Shingrix-never, did not have chicken pox  PSA-unknown  Colonoscopy-unknown; cologuard ordered at last visit  Hep C screen-never    The following portions of the patient's history were reviewed and updated as appropriate: allergies, current  medications, past family history, past medical history, past social history, past surgical history, and problem list.    Current Outpatient Medications:     Aspirin Low Dose 81 MG chewable tablet, CHEW ONE TABLET BY MOUTH DAILY, Disp: 90 tablet, Rfl: 1    atorvastatin (LIPITOR) 80 MG tablet, Take 1 tablet by mouth Daily., Disp: 90 tablet, Rfl: 0    cyanocobalamin (VITAMIN B-12) 1000 MCG tablet, Take 1 tablet by mouth Daily., Disp: 90 tablet, Rfl: 1    Fluticasone-Umeclidin-Vilant (Trelegy Ellipta) 100-62.5-25 MCG/ACT inhaler, Inhale 1 puff Daily., Disp: 1 each, Rfl: 11    gabapentin (NEURONTIN) 600 MG tablet, Take 0.5 tablets by mouth 3 (Three) Times a Day., Disp: , Rfl:     hydrOXYzine (ATARAX) 25 MG tablet, TAKE 1 TABLET BY MOUTH EVERY 8 HOURS AS NEEDED FOR ANXIETY, Disp: 90 tablet, Rfl: 0    nicotine (NICODERM CQ) 21 MG/24HR patch, Place 1 patch on the skin as directed by provider Daily., Disp: 28 each, Rfl: 11    senna (SENOKOT) 8.6 MG tablet, As Needed., Disp: , Rfl:     sennosides-docusate (PERICOLACE) 8.6-50 MG per tablet, Take 1 tablet by mouth Daily., Disp: 90 tablet, Rfl: 1    Ventolin  (90 Base) MCG/ACT inhaler, INHALE TWO PUFFS BY MOUTH EVERY 4 HOURS AS NEEDED FOR WHEEZING, Disp: 18 g, Rfl: 11    Xarelto 20 MG tablet, TAKE ONE TABLET BY MOUTH DAILY WITH DINNER, Disp: 90 tablet, Rfl: 1    oxyCODONE-acetaminophen (PERCOCET)  MG per tablet, Take 1 tablet by mouth Every 6 (Six) Hours As Needed., Disp: , Rfl:      Review of Systems   Constitutional:  Negative for chills, fatigue and fever.   Respiratory:  Negative for cough, chest tightness and shortness of breath.    Cardiovascular:  Negative for chest pain.   Gastrointestinal:  Negative for abdominal pain, diarrhea, nausea and vomiting.   Endocrine: Negative for cold intolerance and heat intolerance.   Musculoskeletal:  Negative for arthralgias.   Neurological:  Negative for dizziness.       Objective   Physical Exam  Constitutional:        Appearance: He is well-developed.   HENT:      Head: Normocephalic and atraumatic.   Eyes:      Conjunctiva/sclera: Conjunctivae normal.      Pupils: Pupils are equal, round, and reactive to light.   Cardiovascular:      Rate and Rhythm: Normal rate and regular rhythm.      Heart sounds: Normal heart sounds.   Pulmonary:      Effort: Pulmonary effort is normal.   Abdominal:      General: Bowel sounds are normal.      Palpations: Abdomen is soft.   Musculoskeletal:         General: Normal range of motion.      Cervical back: Normal range of motion.   Skin:     General: Skin is warm and dry.   Neurological:      Mental Status: He is alert and oriented to person, place, and time.      Deep Tendon Reflexes: Reflexes are normal and symmetric.   Psychiatric:         Behavior: Behavior normal.         Thought Content: Thought content normal.         Judgment: Judgment normal.       There were no vitals filed for this visit.      Assessment & Plan   Diagnoses and all orders for this visit:    1. Coronary artery disease involving native coronary artery of native heart without angina pectoris (Primary)  -     T4, Free; Future  -     T3, Free; Future  -     TSH; Future  -     Basic Metabolic Panel; Future    2. Tobacco dependence    3. Needs flu shot  -     Fluzone >6 Months (1124-6636)    4. Essential hypertension  -     T4, Free; Future  -     T3, Free; Future  -     TSH; Future  -     Basic Metabolic Panel; Future    5. Mixed hyperlipidemia  -     T4, Free; Future  -     T3, Free; Future  -     TSH; Future  -     Basic Metabolic Panel; Future    6. PAD (peripheral artery disease)  -     T4, Free; Future  -     T3, Free; Future  -     TSH; Future  -     Basic Metabolic Panel; Future    7. Chronic obstructive pulmonary disease, unspecified COPD type  -     T4, Free; Future  -     T3, Free; Future  -     TSH; Future  -     Basic Metabolic Panel; Future    8. Long-term current use of opiate analgesic  -     T4, Free; Future  -      T3, Free; Future  -     TSH; Future  -     Basic Metabolic Panel; Future    9. Chronic low back pain, unspecified back pain laterality, unspecified whether sciatica present  -     T4, Free; Future  -     T3, Free; Future  -     TSH; Future  -     Basic Metabolic Panel; Future    10. Abnormal thyroid function test  -     T4, Free; Future  -     T3, Free; Future  -     TSH; Future  -     Basic Metabolic Panel; Future      Labs reviewed from Sept 2023, will ck additional thyroid labs  Flu shot updated  No med changes  Return in about 6 months (around 4/19/2024) for Annual, collect labs today.

## 2023-10-20 LAB
ANION GAP SERPL CALCULATED.3IONS-SCNC: 8.6 MMOL/L (ref 5–15)
BUN SERPL-MCNC: 13 MG/DL (ref 8–23)
BUN/CREAT SERPL: 9 (ref 7–25)
CALCIUM SPEC-SCNC: 8.8 MG/DL (ref 8.6–10.5)
CHLORIDE SERPL-SCNC: 101 MMOL/L (ref 98–107)
CO2 SERPL-SCNC: 26.4 MMOL/L (ref 22–29)
CREAT SERPL-MCNC: 1.44 MG/DL (ref 0.76–1.27)
EGFRCR SERPLBLD CKD-EPI 2021: 54.9 ML/MIN/1.73
GLUCOSE SERPL-MCNC: 136 MG/DL (ref 65–99)
POTASSIUM SERPL-SCNC: 3.9 MMOL/L (ref 3.5–5.2)
SODIUM SERPL-SCNC: 136 MMOL/L (ref 136–145)
T3FREE SERPL-MCNC: 2.91 PG/ML (ref 2–4.4)
T4 FREE SERPL-MCNC: 1.31 NG/DL (ref 0.93–1.7)
TSH SERPL DL<=0.05 MIU/L-ACNC: 1.97 UIU/ML (ref 0.27–4.2)

## 2023-11-03 ENCOUNTER — TELEPHONE (OUTPATIENT)
Dept: INTERNAL MEDICINE | Facility: CLINIC | Age: 63
End: 2023-11-03
Payer: MEDICARE

## 2023-11-03 NOTE — TELEPHONE ENCOUNTER
----- Message from HYACINTH Damon sent at 11/3/2023  9:12 AM EDT -----  Kidney function has worsened slightly.  If he is taking any anti-inflammatories I recommend discontinuing these.  I also recommend increasing his water intake.    Repeat/additional thyroid labs were normal.

## 2023-11-03 NOTE — TELEPHONE ENCOUNTER
L/M HUB TO RELAY  Reyna Aleman, Brooke Real, MA  Kidney function has worsened slightly.  If he is taking any anti-inflammatories I recommend discontinuing these.  I also recommend increasing his water intake.    Repeat/additional thyroid labs were normal.

## 2023-12-21 RX ORDER — ASPIRIN 81 MG
TABLET,CHEWABLE ORAL
Qty: 90 TABLET | Refills: 0 | Status: SHIPPED | OUTPATIENT
Start: 2023-12-21

## 2024-01-02 RX ORDER — HYDROXYZINE HYDROCHLORIDE 25 MG/1
TABLET, FILM COATED ORAL
Qty: 90 TABLET | Refills: 0 | Status: SHIPPED | OUTPATIENT
Start: 2024-01-02

## 2024-01-10 RX ORDER — ATORVASTATIN CALCIUM 80 MG/1
80 TABLET, FILM COATED ORAL DAILY
Qty: 90 TABLET | Refills: 0 | Status: SHIPPED | OUTPATIENT
Start: 2024-01-10

## 2024-01-15 RX ORDER — RIVAROXABAN 20 MG/1
20 TABLET, FILM COATED ORAL
Qty: 90 TABLET | Refills: 0 | Status: SHIPPED | OUTPATIENT
Start: 2024-01-15

## 2024-04-15 RX ORDER — HYDROXYZINE HYDROCHLORIDE 25 MG/1
25 TABLET, FILM COATED ORAL EVERY 8 HOURS PRN
Qty: 90 TABLET | Refills: 0 | Status: SHIPPED | OUTPATIENT
Start: 2024-04-15

## 2024-04-15 NOTE — TELEPHONE ENCOUNTER
Rx Refill Note  Requested Prescriptions     Pending Prescriptions Disp Refills    hydrOXYzine (ATARAX) 25 MG tablet [Pharmacy Med Name: hydrOXYzine HCL 25 MG TABLET] 90 tablet 0     Sig: TAKE ONE TABLET BY MOUTH EVERY 8 HOURS AS NEEDED FOR ANXIETY      Last office visit with prescribing clinician: 10/19/2023   Last telemedicine visit with prescribing clinician: Visit date not found   Next office visit with prescribing clinician: 4/22/2024       Erica Gastelum MA  04/15/24, 08:02 EDT

## 2024-04-16 RX ORDER — ATORVASTATIN CALCIUM 80 MG/1
80 TABLET, FILM COATED ORAL DAILY
Qty: 90 TABLET | Refills: 0 | Status: SHIPPED | OUTPATIENT
Start: 2024-04-16

## 2024-04-16 NOTE — TELEPHONE ENCOUNTER
Rx Refill Note  Requested Prescriptions     Pending Prescriptions Disp Refills    atorvastatin (LIPITOR) 80 MG tablet [Pharmacy Med Name: ATORVASTATIN 80 MG TABLET] 90 tablet 0     Sig: TAKE 1 TABLET BY MOUTH DAILY      Last office visit with prescribing clinician: 10/19/2023   Last telemedicine visit with prescribing clinician: Visit date not found   Next office visit with prescribing clinician: 4/22/2024       Erica Gastelum MA  04/16/24, 07:52 EDT

## 2024-06-17 RX ORDER — RIVAROXABAN 20 MG/1
20 TABLET, FILM COATED ORAL
Qty: 30 TABLET | Refills: 0 | Status: SHIPPED | OUTPATIENT
Start: 2024-06-17

## 2024-06-17 RX ORDER — HYDROXYZINE HYDROCHLORIDE 25 MG/1
25 TABLET, FILM COATED ORAL EVERY 8 HOURS PRN
Qty: 30 TABLET | Refills: 0 | Status: SHIPPED | OUTPATIENT
Start: 2024-06-17

## 2024-06-17 RX ORDER — ATORVASTATIN CALCIUM 80 MG/1
80 TABLET, FILM COATED ORAL DAILY
Qty: 30 TABLET | Refills: 0 | Status: SHIPPED | OUTPATIENT
Start: 2024-06-17

## 2024-06-17 NOTE — TELEPHONE ENCOUNTER
Rx Refill Note  Requested Prescriptions     Pending Prescriptions Disp Refills    hydrOXYzine (ATARAX) 25 MG tablet [Pharmacy Med Name: hydrOXYzine HCL 25MG TABLET] 90 tablet 0     Sig: TAKE ONE TABLET BY MOUTH EVERY 8 HOURS AS NEEDED FOR ANXIETY    atorvastatin (LIPITOR) 80 MG tablet [Pharmacy Med Name: ATORVASTATIN 80 MG TABLET] 90 tablet 0     Sig: TAKE 1 TABLET BY MOUTH DAILY    Xarelto 20 MG tablet [Pharmacy Med Name: XARELTO 20 MG TABLET] 90 tablet 0     Sig: TAKE 1 TABLET BY MOUTH DAILY WITH DINNER      Last office visit with prescribing clinician: 10/19/2023   Last telemedicine visit with prescribing clinician: Visit date not found   Next office visit with prescribing clinician: Visit date not found     Erica Gastelum MA  06/17/24, 09:25 EDT

## 2024-06-29 DIAGNOSIS — J43.1 PANLOBULAR EMPHYSEMA: ICD-10-CM

## 2024-08-15 RX ORDER — RIVAROXABAN 20 MG/1
TABLET, FILM COATED ORAL
Qty: 30 TABLET | Refills: 0 | OUTPATIENT
Start: 2024-08-15

## 2024-08-15 RX ORDER — HYDROXYZINE HYDROCHLORIDE 25 MG/1
25 TABLET, FILM COATED ORAL EVERY 8 HOURS PRN
Qty: 30 TABLET | Refills: 0 | OUTPATIENT
Start: 2024-08-15

## 2025-04-16 ENCOUNTER — TELEPHONE (OUTPATIENT)
Dept: INTERNAL MEDICINE | Facility: CLINIC | Age: 65
End: 2025-04-16
Payer: MEDICARE

## 2025-04-16 NOTE — TELEPHONE ENCOUNTER
Caller: Kike Escamilla    Relationship: Self    Best call back [phone]     What is the best time to reach you: ANYTIME    Who are you requesting to speak with (clinical staff, provider,  specific staff member): CLINICAL    Do you know the name of the person who called: PAZ    What was the call regarding: PATIENT WOULD LIKE A CALL BACK

## 2025-04-17 NOTE — TELEPHONE ENCOUNTER
Patient has been notified he needs an appointment for medication refills.  He was last seen in October 2024.  He made an appointment for Monday for his medication refills.  I offered an appointment today be he declined.

## 2025-04-21 ENCOUNTER — TELEMEDICINE (OUTPATIENT)
Dept: INTERNAL MEDICINE | Facility: CLINIC | Age: 65
End: 2025-04-21
Payer: MEDICARE

## 2025-04-21 DIAGNOSIS — Z12.11 SCREENING FOR COLON CANCER: ICD-10-CM

## 2025-04-21 DIAGNOSIS — Z79.01 ON ANTICOAGULANT THERAPY: ICD-10-CM

## 2025-04-21 DIAGNOSIS — R73.9 HYPERGLYCEMIA: ICD-10-CM

## 2025-04-21 DIAGNOSIS — Z79.891 LONG-TERM CURRENT USE OF OPIATE ANALGESIC: ICD-10-CM

## 2025-04-21 DIAGNOSIS — I70.213 ATHEROSCLEROSIS OF NATIVE ARTERY OF BOTH LOWER EXTREMITIES WITH INTERMITTENT CLAUDICATION: ICD-10-CM

## 2025-04-21 DIAGNOSIS — Z12.5 SCREENING FOR PROSTATE CANCER: ICD-10-CM

## 2025-04-21 DIAGNOSIS — R41.3 MEMORY DEFICIT: ICD-10-CM

## 2025-04-21 DIAGNOSIS — I25.2 HISTORY OF MI (MYOCARDIAL INFARCTION): ICD-10-CM

## 2025-04-21 DIAGNOSIS — J43.1 PANLOBULAR EMPHYSEMA: ICD-10-CM

## 2025-04-21 DIAGNOSIS — E53.8 B12 DEFICIENCY: ICD-10-CM

## 2025-04-21 DIAGNOSIS — Z11.59 ENCOUNTER FOR HEPATITIS C SCREENING TEST FOR LOW RISK PATIENT: ICD-10-CM

## 2025-04-21 DIAGNOSIS — I10 ESSENTIAL HYPERTENSION: ICD-10-CM

## 2025-04-21 DIAGNOSIS — G89.4 CHRONIC PAIN DISORDER: ICD-10-CM

## 2025-04-21 DIAGNOSIS — F41.0 PANIC ATTACKS: ICD-10-CM

## 2025-04-21 DIAGNOSIS — I25.10 CORONARY ARTERY DISEASE INVOLVING NATIVE CORONARY ARTERY OF NATIVE HEART WITHOUT ANGINA PECTORIS: Primary | ICD-10-CM

## 2025-04-21 DIAGNOSIS — E55.9 VITAMIN D DEFICIENCY, UNSPECIFIED: ICD-10-CM

## 2025-04-21 DIAGNOSIS — E78.2 MIXED HYPERLIPIDEMIA: ICD-10-CM

## 2025-04-21 DIAGNOSIS — I48.0 PAROXYSMAL ATRIAL FIBRILLATION: ICD-10-CM

## 2025-04-21 PROCEDURE — 1125F AMNT PAIN NOTED PAIN PRSNT: CPT | Performed by: NURSE PRACTITIONER

## 2025-04-21 PROCEDURE — 1160F RVW MEDS BY RX/DR IN RCRD: CPT | Performed by: NURSE PRACTITIONER

## 2025-04-21 PROCEDURE — 1159F MED LIST DOCD IN RCRD: CPT | Performed by: NURSE PRACTITIONER

## 2025-04-21 PROCEDURE — 99214 OFFICE O/P EST MOD 30 MIN: CPT | Performed by: NURSE PRACTITIONER

## 2025-04-21 PROCEDURE — G2211 COMPLEX E/M VISIT ADD ON: HCPCS | Performed by: NURSE PRACTITIONER

## 2025-04-21 RX ORDER — FLUTICASONE FUROATE, UMECLIDINIUM BROMIDE AND VILANTEROL TRIFENATATE 100; 62.5; 25 UG/1; UG/1; UG/1
1 POWDER RESPIRATORY (INHALATION) DAILY
Qty: 60 EACH | OUTPATIENT
Start: 2025-04-21

## 2025-04-21 RX ORDER — ATORVASTATIN CALCIUM 80 MG/1
80 TABLET, FILM COATED ORAL DAILY
Qty: 90 TABLET | Refills: 1 | Status: SHIPPED | OUTPATIENT
Start: 2025-04-21

## 2025-04-21 RX ORDER — HYDROXYZINE HYDROCHLORIDE 25 MG/1
25 TABLET, FILM COATED ORAL EVERY 8 HOURS PRN
Qty: 90 TABLET | Refills: 2 | Status: SHIPPED | OUTPATIENT
Start: 2025-04-21

## 2025-04-21 RX ORDER — ALBUTEROL SULFATE 90 UG/1
2 INHALANT RESPIRATORY (INHALATION) EVERY 4 HOURS PRN
Qty: 18 G | Refills: 11 | Status: SHIPPED | OUTPATIENT
Start: 2025-04-21

## 2025-04-21 RX ORDER — RIVAROXABAN 20 MG/1
20 TABLET, FILM COATED ORAL
Qty: 30 TABLET | Refills: 5 | Status: SHIPPED | OUTPATIENT
Start: 2025-04-21

## 2025-04-21 RX ORDER — FLUTICASONE FUROATE, UMECLIDINIUM BROMIDE AND VILANTEROL TRIFENATATE 100; 62.5; 25 UG/1; UG/1; UG/1
1 POWDER RESPIRATORY (INHALATION)
Qty: 1 EACH | Refills: 11 | Status: SHIPPED | OUTPATIENT
Start: 2025-04-21

## 2025-04-21 NOTE — PROGRESS NOTES
Subjective   Kike Escamilla is a 64 y.o. male    Chief Complaint   Patient presents with    Coronary Artery Disease    Hypertension    Hyperlipidemia    Atrial Fibrillation    PAD    COPD    Memory Loss     History of Present Illness     This was an audio and video enabled telemedicine encounter.     You have chosen to receive care through a telehealth visit.  Do you consent to use a video/audio connection for your medical care today? Yes    My location: my home in [facility]  Pt location: pt's home in [facility]    Kike has not been seen in 1.5 years.  States that he is only taking his ASA and his pain med per pain management.      Peripheral vascular disease: History of aortoiliac stenting with acute occlusion in August 2021 requiring emergent surgical thrombectomy and 4 compartment fasciotomies of the lower extremity. Currently with chronic bilateral lower extremity neuropathic pain and potential limiting claudication.  Feels that his sx's are getting worse.  Needs a referral back to vascular.     CAD/PAF - h/o MI, unsure of date.  He was followed previously by [facility], but requested to transfer to [facility] cardiology.  He did establish with Dr. Barry, but he has not seen him in quite some time due to insurance issues.  He requests a referral back to Dr. Barry.     Emphysema/COPD-followed by pulmonology, but again has not been seen in well over a year and needs new referral.  Was found to have AVM needing embolization per CT scan over 2 years ago.  This has not been done and he has not followed up with Pulm.  Current regimen should be Trelegy and PRN Albuterol, but he has not been using either.  He is unfortunately still smoking, but states that he has cut back from 5 cigarettes with his morning coffee to only 2.     HL - chronic; should be taking Lipitor 80 mg daily but he is unsure in how long it has been since he has taken this med        Lab Results   Component Value Date     CHOL 119 09/15/2023      TRIG 76 09/15/2023     HDL 35 (L) 09/15/2023     LDL 69 09/15/2023      Chronic Pain - managed by [facility] pain; states that pain is well controlled on current regimen.  He has continued to keep regular f/u's with pain management.     Memory loss-asking to be referred back to Neurology. They were concerned with his pain medication regimen and h/o ETOH use at this last visit     COVID-12/22  Flu-10/2023  Tdap-12/6/2021  Prevnar 13 - 11/2/2020  Pneumovax-12/6/2021  Shingrix-never, did not have chicken pox  PSA-unknown  Colonoscopy-unknown; cologuard ordered at last visit, sample wasn't accepted  Hep C screen-never    The following portions of the patient's history were reviewed and updated as appropriate: allergies, current medications, past family history, past medical history, past social history, past surgical history, and problem list.    Current Outpatient Medications:     albuterol sulfate HFA (Ventolin HFA) 108 (90 Base) MCG/ACT inhaler, Inhale 2 puffs Every 4 (Four) Hours As Needed for Wheezing., Disp: 18 g, Rfl: 11    atorvastatin (LIPITOR) 80 MG tablet, Take 1 tablet by mouth Daily., Disp: 90 tablet, Rfl: 1    cyanocobalamin (VITAMIN B-12) 1000 MCG tablet, Take 1 tablet by mouth Daily., Disp: 90 tablet, Rfl: 1    Fluticasone-Umeclidin-Vilant (Trelegy Ellipta) 100-62.5-25 MCG/ACT inhaler, Inhale 1 puff Daily., Disp: 1 each, Rfl: 11    hydrOXYzine (ATARAX) 25 MG tablet, Take 1 tablet by mouth Every 8 (Eight) Hours As Needed for Anxiety. for anxiety, Disp: 90 tablet, Rfl: 2    Xarelto 20 MG tablet, Take 1 tablet by mouth Daily With Dinner., Disp: 30 tablet, Rfl: 5    aspirin (Aspirin Low Dose) 81 MG chewable tablet, CHEW ONE TABLET BY MOUTH DAILY, Disp: 90 tablet, Rfl: 0    gabapentin (NEURONTIN) 600 MG tablet, Take 0.5 tablets by mouth 3 (Three) Times a Day., Disp: , Rfl:     nicotine (NICODERM CQ) 21 MG/24HR patch, Place 1 patch on the skin as directed by provider Daily., Disp: 28 each, Rfl: 11     oxyCODONE-acetaminophen (PERCOCET)  MG per tablet, Take 1 tablet by mouth Every 6 (Six) Hours As Needed., Disp: , Rfl:     senna (SENOKOT) 8.6 MG tablet, As Needed., Disp: , Rfl:     sennosides-docusate (PERICOLACE) 8.6-50 MG per tablet, Take 1 tablet by mouth Daily., Disp: 90 tablet, Rfl: 1     Review of Systems   Constitutional:  Positive for fatigue. Negative for chills and fever.   Respiratory:  Negative for cough, chest tightness and shortness of breath.    Cardiovascular:  Negative for chest pain.   Gastrointestinal:  Negative for abdominal pain, diarrhea, nausea and vomiting.   Endocrine: Negative for cold intolerance and heat intolerance.   Musculoskeletal:  Positive for arthralgias, back pain and myalgias.   Neurological:  Negative for dizziness.        Memory loss   Psychiatric/Behavioral:  The patient is nervous/anxious.        Objective   Physical Exam  Constitutional:       Appearance: Normal appearance.   HENT:      Head: Normocephalic.      Nose: Nose normal. No congestion.      Mouth/Throat:      Pharynx: Oropharynx is clear.   Eyes:      Extraocular Movements: Extraocular movements intact.      Pupils: Pupils are equal, round, and reactive to light.   Pulmonary:      Effort: Pulmonary effort is normal.   Musculoskeletal:         General: Normal range of motion.      Cervical back: Normal range of motion.   Neurological:      Mental Status: He is oriented to person, place, and time.   Psychiatric:         Mood and Affect: Mood is anxious.         Behavior: Behavior normal.       There were no vitals filed for this visit.      Assessment & Plan   Diagnoses and all orders for this visit:    1. Coronary artery disease involving native coronary artery of native heart without angina pectoris (Primary)  -     atorvastatin (LIPITOR) 80 MG tablet; Take 1 tablet by mouth Daily.  Dispense: 90 tablet; Refill: 1  -     CBC & Differential; Future  -     Comprehensive Metabolic Panel; Future  -     Lipid  Panel; Future  -     TSH; Future  -     Vitamin B12; Future  -     Vitamin D,25-Hydroxy; Future  -     Hemoglobin A1c; Future  -     PSA Screen; Future  -     Ambulatory Referral to Cardiology    2. Panlobular emphysema  -     albuterol sulfate HFA (Ventolin HFA) 108 (90 Base) MCG/ACT inhaler; Inhale 2 puffs Every 4 (Four) Hours As Needed for Wheezing.  Dispense: 18 g; Refill: 11  -     Fluticasone-Umeclidin-Vilant (Trelegy Ellipta) 100-62.5-25 MCG/ACT inhaler; Inhale 1 puff Daily.  Dispense: 1 each; Refill: 11  -     CBC & Differential; Future  -     Comprehensive Metabolic Panel; Future  -     Lipid Panel; Future  -     TSH; Future  -     Vitamin B12; Future  -     Vitamin D,25-Hydroxy; Future  -     Hemoglobin A1c; Future  -     PSA Screen; Future  -     Ambulatory Referral to Pulmonology    3. Atherosclerosis of native artery of both lower extremities with intermittent claudication  -     atorvastatin (LIPITOR) 80 MG tablet; Take 1 tablet by mouth Daily.  Dispense: 90 tablet; Refill: 1  -     CBC & Differential; Future  -     Comprehensive Metabolic Panel; Future  -     Lipid Panel; Future  -     TSH; Future  -     Vitamin B12; Future  -     Vitamin D,25-Hydroxy; Future  -     Hemoglobin A1c; Future  -     PSA Screen; Future  -     Ambulatory Referral to Vascular Surgery    4. Essential hypertension  -     CBC & Differential; Future  -     Comprehensive Metabolic Panel; Future  -     Lipid Panel; Future  -     TSH; Future  -     Vitamin B12; Future  -     Vitamin D,25-Hydroxy; Future  -     Hemoglobin A1c; Future  -     PSA Screen; Future    5. History of MI (myocardial infarction)  -     atorvastatin (LIPITOR) 80 MG tablet; Take 1 tablet by mouth Daily.  Dispense: 90 tablet; Refill: 1  -     CBC & Differential; Future  -     Comprehensive Metabolic Panel; Future  -     Lipid Panel; Future  -     TSH; Future  -     Vitamin B12; Future  -     Vitamin D,25-Hydroxy; Future  -     Hemoglobin A1c; Future  -     PSA  Screen; Future  -     Ambulatory Referral to Cardiology    6. Mixed hyperlipidemia  -     atorvastatin (LIPITOR) 80 MG tablet; Take 1 tablet by mouth Daily.  Dispense: 90 tablet; Refill: 1  -     CBC & Differential; Future  -     Comprehensive Metabolic Panel; Future  -     Lipid Panel; Future  -     TSH; Future  -     Vitamin B12; Future  -     Vitamin D,25-Hydroxy; Future  -     Hemoglobin A1c; Future  -     PSA Screen; Future    7. Paroxysmal atrial fibrillation  -     Xarelto 20 MG tablet; Take 1 tablet by mouth Daily With Dinner.  Dispense: 30 tablet; Refill: 5  -     CBC & Differential; Future  -     Comprehensive Metabolic Panel; Future  -     Lipid Panel; Future  -     TSH; Future  -     Vitamin B12; Future  -     Vitamin D,25-Hydroxy; Future  -     Hemoglobin A1c; Future  -     PSA Screen; Future  -     Ambulatory Referral to Cardiology    8. On anticoagulant therapy  -     Xarelto 20 MG tablet; Take 1 tablet by mouth Daily With Dinner.  Dispense: 30 tablet; Refill: 5  -     CBC & Differential; Future  -     Comprehensive Metabolic Panel; Future  -     Lipid Panel; Future  -     TSH; Future  -     Vitamin B12; Future  -     Vitamin D,25-Hydroxy; Future  -     Hemoglobin A1c; Future  -     PSA Screen; Future    9. Hyperglycemia  -     CBC & Differential; Future  -     Comprehensive Metabolic Panel; Future  -     Lipid Panel; Future  -     TSH; Future  -     Vitamin B12; Future  -     Vitamin D,25-Hydroxy; Future  -     Hemoglobin A1c; Future  -     PSA Screen; Future    10. Long-term current use of opiate analgesic  -     CBC & Differential; Future  -     Comprehensive Metabolic Panel; Future  -     Lipid Panel; Future  -     TSH; Future  -     Vitamin B12; Future  -     Vitamin D,25-Hydroxy; Future  -     Hemoglobin A1c; Future  -     PSA Screen; Future    11. Chronic pain disorder  -     CBC & Differential; Future  -     Comprehensive Metabolic Panel; Future  -     Lipid Panel; Future  -     TSH; Future  -      Vitamin B12; Future  -     Vitamin D,25-Hydroxy; Future  -     Hemoglobin A1c; Future  -     PSA Screen; Future    12. Memory deficit  -     CBC & Differential; Future  -     Comprehensive Metabolic Panel; Future  -     Lipid Panel; Future  -     TSH; Future  -     Vitamin B12; Future  -     Vitamin D,25-Hydroxy; Future  -     Hemoglobin A1c; Future  -     PSA Screen; Future  -     Ambulatory Referral to Neurology    13. B12 deficiency  -     cyanocobalamin (VITAMIN B-12) 1000 MCG tablet; Take 1 tablet by mouth Daily.  Dispense: 90 tablet; Refill: 1  -     CBC & Differential; Future  -     Comprehensive Metabolic Panel; Future  -     Lipid Panel; Future  -     TSH; Future  -     Vitamin B12; Future  -     Vitamin D,25-Hydroxy; Future  -     Hemoglobin A1c; Future  -     PSA Screen; Future    14. Panic attacks  -     hydrOXYzine (ATARAX) 25 MG tablet; Take 1 tablet by mouth Every 8 (Eight) Hours As Needed for Anxiety. for anxiety  Dispense: 90 tablet; Refill: 2  -     CBC & Differential; Future  -     Comprehensive Metabolic Panel; Future  -     Lipid Panel; Future  -     TSH; Future  -     Vitamin B12; Future  -     Vitamin D,25-Hydroxy; Future  -     Hemoglobin A1c; Future  -     PSA Screen; Future    15. Vitamin D deficiency, unspecified  -     Vitamin D,25-Hydroxy; Future    16. Screening for prostate cancer  -     PSA Screen; Future    17. Screening for colon cancer  -     Cologuard - Stool, Per Rectum; Future    18. Encounter for hepatitis C screening test for low risk patient  -     Hepatitis C Antibody; Future      Meds refilled without change  Will check labs  Cologuard ordered  Referred back to vascular, cardiology, pulmonology and neurology  Return in about 3 months (around 7/21/2025) for Medicare Wellness.

## 2025-04-25 ENCOUNTER — TELEPHONE (OUTPATIENT)
Dept: INTERNAL MEDICINE | Facility: CLINIC | Age: 65
End: 2025-04-25
Payer: MEDICARE

## 2025-04-25 NOTE — TELEPHONE ENCOUNTER
Caller: Kike Escamilla    Relationship: Self    Best call back number: [phone]     Which medication are you concerned about: TRELEGY AND VENTOLIN INHALERS    Who prescribed you this medication: [facility]    When did you start taking this medication:     What are your concerns: PATIENT STATES THAT THIS MEDICATION IS TOO EXPENSIVE AND WOULD LIKE TO KNOW IF THERE IS AN ALTERNATIVE MEDICATION THAT COULD BE CALLED IN FOR HIM OR IF WE HAVE ANY SAMPLES ON HAND AT THE OFFICE. (I GOOGLED THE TRELEGY WEBSITE AND THERE IS A PROGRAM THEY OFFER FOR LICENSED PRESCRIBERS SO THEY CAN ORDER SAMPLES AND GET SAVINGS ONLINE)    How long have you had these concerns:

## 2025-04-25 NOTE — TELEPHONE ENCOUNTER
Caller: Kike Escamilla    Relationship: Self    Best call back number: [phone]    What was the call regarding: PATIENT CALLED STATING THAT IT IS THE BLOOD THINNER HE IS ON IS ALSO EXPENSIVE AND IS WANTING AN ALTERNATIVE

## 2025-05-01 DIAGNOSIS — I48.0 PAF (PAROXYSMAL ATRIAL FIBRILLATION): Primary | ICD-10-CM

## 2025-05-02 ENCOUNTER — LAB (OUTPATIENT)
Dept: INTERNAL MEDICINE | Facility: CLINIC | Age: 65
End: 2025-05-02
Payer: MEDICARE

## 2025-05-02 DIAGNOSIS — R73.9 HYPERGLYCEMIA: ICD-10-CM

## 2025-05-02 DIAGNOSIS — I10 ESSENTIAL HYPERTENSION: ICD-10-CM

## 2025-05-02 DIAGNOSIS — I25.2 HISTORY OF MI (MYOCARDIAL INFARCTION): ICD-10-CM

## 2025-05-02 DIAGNOSIS — E78.2 MIXED HYPERLIPIDEMIA: ICD-10-CM

## 2025-05-02 DIAGNOSIS — I70.213 ATHEROSCLEROSIS OF NATIVE ARTERY OF BOTH LOWER EXTREMITIES WITH INTERMITTENT CLAUDICATION: ICD-10-CM

## 2025-05-02 DIAGNOSIS — I48.0 PAROXYSMAL ATRIAL FIBRILLATION: ICD-10-CM

## 2025-05-02 DIAGNOSIS — J43.1 PANLOBULAR EMPHYSEMA: ICD-10-CM

## 2025-05-02 DIAGNOSIS — R41.3 MEMORY DEFICIT: ICD-10-CM

## 2025-05-02 DIAGNOSIS — Z12.5 SCREENING FOR PROSTATE CANCER: ICD-10-CM

## 2025-05-02 DIAGNOSIS — F41.0 PANIC ATTACKS: ICD-10-CM

## 2025-05-02 DIAGNOSIS — G89.4 CHRONIC PAIN DISORDER: ICD-10-CM

## 2025-05-02 DIAGNOSIS — Z11.59 ENCOUNTER FOR HEPATITIS C SCREENING TEST FOR LOW RISK PATIENT: ICD-10-CM

## 2025-05-02 DIAGNOSIS — E55.9 VITAMIN D DEFICIENCY, UNSPECIFIED: ICD-10-CM

## 2025-05-02 DIAGNOSIS — I25.10 CORONARY ARTERY DISEASE INVOLVING NATIVE CORONARY ARTERY OF NATIVE HEART WITHOUT ANGINA PECTORIS: ICD-10-CM

## 2025-05-02 DIAGNOSIS — E53.8 B12 DEFICIENCY: ICD-10-CM

## 2025-05-02 DIAGNOSIS — Z79.891 LONG-TERM CURRENT USE OF OPIATE ANALGESIC: ICD-10-CM

## 2025-05-02 DIAGNOSIS — Z79.01 ON ANTICOAGULANT THERAPY: ICD-10-CM

## 2025-05-02 LAB
25(OH)D3 SERPL-MCNC: 13.2 NG/ML (ref 30–100)
BASOPHILS # BLD AUTO: 0.03 10*3/MM3 (ref 0–0.2)
BASOPHILS NFR BLD AUTO: 0.5 % (ref 0–1.5)
CHOLEST SERPL-MCNC: 103 MG/DL (ref 0–200)
DEPRECATED RDW RBC AUTO: 44.9 FL (ref 37–54)
EOSINOPHIL # BLD AUTO: 0.28 10*3/MM3 (ref 0–0.4)
EOSINOPHIL NFR BLD AUTO: 4.9 % (ref 0.3–6.2)
ERYTHROCYTE [DISTWIDTH] IN BLOOD BY AUTOMATED COUNT: 13.1 % (ref 12.3–15.4)
HBA1C MFR BLD: 5.6 % (ref 4.8–5.6)
HCT VFR BLD AUTO: 33.4 % (ref 37.5–51)
HCV AB SER QL: NORMAL
HDLC SERPL-MCNC: 29 MG/DL (ref 40–60)
HGB BLD-MCNC: 11.2 G/DL (ref 13–17.7)
IMM GRANULOCYTES # BLD AUTO: 0.01 10*3/MM3 (ref 0–0.05)
IMM GRANULOCYTES NFR BLD AUTO: 0.2 % (ref 0–0.5)
LDLC SERPL CALC-MCNC: 60 MG/DL (ref 0–100)
LDLC/HDLC SERPL: 2.1 {RATIO}
LYMPHOCYTES # BLD AUTO: 1.6 10*3/MM3 (ref 0.7–3.1)
LYMPHOCYTES NFR BLD AUTO: 27.9 % (ref 19.6–45.3)
MCH RBC QN AUTO: 31.5 PG (ref 26.6–33)
MCHC RBC AUTO-ENTMCNC: 33.5 G/DL (ref 31.5–35.7)
MCV RBC AUTO: 93.8 FL (ref 79–97)
MONOCYTES # BLD AUTO: 0.39 10*3/MM3 (ref 0.1–0.9)
MONOCYTES NFR BLD AUTO: 6.8 % (ref 5–12)
NEUTROPHILS NFR BLD AUTO: 3.43 10*3/MM3 (ref 1.7–7)
NEUTROPHILS NFR BLD AUTO: 59.7 % (ref 42.7–76)
NRBC BLD AUTO-RTO: 0 /100 WBC (ref 0–0.2)
PLATELET # BLD AUTO: 139 10*3/MM3 (ref 140–450)
PMV BLD AUTO: 11.2 FL (ref 6–12)
PSA SERPL-MCNC: 2.04 NG/ML (ref 0–4)
RBC # BLD AUTO: 3.56 10*6/MM3 (ref 4.14–5.8)
TRIGL SERPL-MCNC: 65 MG/DL (ref 0–150)
TSH SERPL DL<=0.05 MIU/L-ACNC: 5.21 UIU/ML (ref 0.27–4.2)
VIT B12 BLD-MCNC: 435 PG/ML (ref 211–946)
VLDLC SERPL-MCNC: 14 MG/DL (ref 5–40)
WBC NRBC COR # BLD AUTO: 5.74 10*3/MM3 (ref 3.4–10.8)

## 2025-05-02 PROCEDURE — 82607 VITAMIN B-12: CPT | Performed by: NURSE PRACTITIONER

## 2025-05-02 PROCEDURE — 86803 HEPATITIS C AB TEST: CPT | Performed by: NURSE PRACTITIONER

## 2025-05-02 PROCEDURE — 85025 COMPLETE CBC W/AUTO DIFF WBC: CPT | Performed by: NURSE PRACTITIONER

## 2025-05-02 PROCEDURE — 82306 VITAMIN D 25 HYDROXY: CPT | Performed by: NURSE PRACTITIONER

## 2025-05-02 PROCEDURE — G0103 PSA SCREENING: HCPCS | Performed by: NURSE PRACTITIONER

## 2025-05-02 PROCEDURE — 80053 COMPREHEN METABOLIC PANEL: CPT | Performed by: NURSE PRACTITIONER

## 2025-05-02 PROCEDURE — 36415 COLL VENOUS BLD VENIPUNCTURE: CPT | Performed by: NURSE PRACTITIONER

## 2025-05-02 PROCEDURE — 84443 ASSAY THYROID STIM HORMONE: CPT | Performed by: NURSE PRACTITIONER

## 2025-05-02 PROCEDURE — 80061 LIPID PANEL: CPT | Performed by: NURSE PRACTITIONER

## 2025-05-02 PROCEDURE — 83036 HEMOGLOBIN GLYCOSYLATED A1C: CPT | Performed by: NURSE PRACTITIONER

## 2025-05-03 LAB
ALBUMIN SERPL-MCNC: 3.7 G/DL (ref 3.5–5.2)
ALBUMIN/GLOB SERPL: 1.2 G/DL
ALP SERPL-CCNC: 107 U/L (ref 39–117)
ALT SERPL W P-5'-P-CCNC: 5 U/L (ref 1–41)
ANION GAP SERPL CALCULATED.3IONS-SCNC: 11.8 MMOL/L (ref 5–15)
AST SERPL-CCNC: 14 U/L (ref 1–40)
BILIRUB SERPL-MCNC: 0.3 MG/DL (ref 0–1.2)
BUN SERPL-MCNC: 23 MG/DL (ref 8–23)
BUN/CREAT SERPL: 12.3 (ref 7–25)
CALCIUM SPEC-SCNC: 8.4 MG/DL (ref 8.6–10.5)
CHLORIDE SERPL-SCNC: 105 MMOL/L (ref 98–107)
CO2 SERPL-SCNC: 20.2 MMOL/L (ref 22–29)
CREAT SERPL-MCNC: 1.87 MG/DL (ref 0.76–1.27)
EGFRCR SERPLBLD CKD-EPI 2021: 39.9 ML/MIN/1.73
GLOBULIN UR ELPH-MCNC: 3 GM/DL
GLUCOSE SERPL-MCNC: 88 MG/DL (ref 65–99)
POTASSIUM SERPL-SCNC: 4.6 MMOL/L (ref 3.5–5.2)
PROT SERPL-MCNC: 6.7 G/DL (ref 6–8.5)
SODIUM SERPL-SCNC: 137 MMOL/L (ref 136–145)

## 2025-05-05 ENCOUNTER — TELEPHONE (OUTPATIENT)
Dept: PULMONOLOGY | Facility: CLINIC | Age: 65
End: 2025-05-05
Payer: MEDICARE

## 2025-05-05 NOTE — TELEPHONE ENCOUNTER
No PA needed at this time. Active PA on file and expires on 5/15/25. Refill sent on 4/21/25 w/ 11 refills. If PA needed after 5/15 pharmacy will notify the office.

## 2025-06-04 ENCOUNTER — TELEPHONE (OUTPATIENT)
Dept: PULMONOLOGY | Facility: CLINIC | Age: 65
End: 2025-06-04
Payer: MEDICARE

## 2025-06-18 NOTE — TELEPHONE ENCOUNTER
NOTIFY PATIENT OF MISSED APPOINTMENT/NO SHOW VIA MAILED LETTER REGARDING POLICY.     NO SHOW 1 OF 3